# Patient Record
Sex: FEMALE | Race: ASIAN | NOT HISPANIC OR LATINO | Employment: OTHER | URBAN - METROPOLITAN AREA
[De-identification: names, ages, dates, MRNs, and addresses within clinical notes are randomized per-mention and may not be internally consistent; named-entity substitution may affect disease eponyms.]

---

## 2017-02-22 ENCOUNTER — OFFICE VISIT (OUTPATIENT)
Dept: MEDICAL GROUP | Facility: PHYSICIAN GROUP | Age: 57
End: 2017-02-22
Payer: COMMERCIAL

## 2017-02-22 ENCOUNTER — HOSPITAL ENCOUNTER (OUTPATIENT)
Dept: LAB | Facility: MEDICAL CENTER | Age: 57
End: 2017-02-22
Attending: FAMILY MEDICINE
Payer: COMMERCIAL

## 2017-02-22 VITALS
RESPIRATION RATE: 14 BRPM | TEMPERATURE: 98.2 F | HEART RATE: 66 BPM | BODY MASS INDEX: 20.87 KG/M2 | HEIGHT: 62 IN | DIASTOLIC BLOOD PRESSURE: 82 MMHG | WEIGHT: 113.4 LBS | OXYGEN SATURATION: 99 % | SYSTOLIC BLOOD PRESSURE: 126 MMHG

## 2017-02-22 DIAGNOSIS — I10 ESSENTIAL HYPERTENSION, BENIGN: ICD-10-CM

## 2017-02-22 DIAGNOSIS — R73.02 IMPAIRED GLUCOSE TOLERANCE: ICD-10-CM

## 2017-02-22 DIAGNOSIS — E78.2 MIXED HYPERLIPIDEMIA: ICD-10-CM

## 2017-02-22 DIAGNOSIS — Z13.29 SCREENING FOR ENDOCRINE DISORDER: ICD-10-CM

## 2017-02-22 DIAGNOSIS — E87.6 LOW SERUM POTASSIUM LEVEL: ICD-10-CM

## 2017-02-22 PROBLEM — Z90.711 S/P LAPAROSCOPIC SUPRACERVICAL HYSTERECTOMY: Status: ACTIVE | Noted: 2017-02-22

## 2017-02-22 LAB
25(OH)D3 SERPL-MCNC: 66 NG/ML (ref 30–100)
ALBUMIN SERPL BCP-MCNC: 4 G/DL (ref 3.2–4.9)
ALBUMIN/GLOB SERPL: 1.3 G/DL
ALP SERPL-CCNC: 47 U/L (ref 30–99)
ALT SERPL-CCNC: 19 U/L (ref 2–50)
ANION GAP SERPL CALC-SCNC: 6 MMOL/L (ref 0–11.9)
AST SERPL-CCNC: 22 U/L (ref 12–45)
BILIRUB SERPL-MCNC: 0.7 MG/DL (ref 0.1–1.5)
BUN SERPL-MCNC: 17 MG/DL (ref 8–22)
CALCIUM SERPL-MCNC: 9.8 MG/DL (ref 8.5–10.5)
CHLORIDE SERPL-SCNC: 104 MMOL/L (ref 96–112)
CHOLEST SERPL-MCNC: 194 MG/DL (ref 100–199)
CO2 SERPL-SCNC: 30 MMOL/L (ref 20–33)
CREAT SERPL-MCNC: 0.52 MG/DL (ref 0.5–1.4)
EST. AVERAGE GLUCOSE BLD GHB EST-MCNC: 114 MG/DL
GLOBULIN SER CALC-MCNC: 3 G/DL (ref 1.9–3.5)
GLUCOSE SERPL-MCNC: 86 MG/DL (ref 65–99)
HBA1C MFR BLD: 5.6 % (ref 0–5.6)
HDLC SERPL-MCNC: 54 MG/DL
LDLC SERPL CALC-MCNC: 119 MG/DL
POTASSIUM SERPL-SCNC: 3.8 MMOL/L (ref 3.6–5.5)
PROT SERPL-MCNC: 7 G/DL (ref 6–8.2)
SODIUM SERPL-SCNC: 140 MMOL/L (ref 135–145)
TRIGL SERPL-MCNC: 106 MG/DL (ref 0–149)
TSH SERPL DL<=0.005 MIU/L-ACNC: 1.93 UIU/ML (ref 0.3–3.7)

## 2017-02-22 PROCEDURE — 99214 OFFICE O/P EST MOD 30 MIN: CPT | Performed by: FAMILY MEDICINE

## 2017-02-22 PROCEDURE — 84443 ASSAY THYROID STIM HORMONE: CPT

## 2017-02-22 PROCEDURE — 36415 COLL VENOUS BLD VENIPUNCTURE: CPT

## 2017-02-22 PROCEDURE — 80053 COMPREHEN METABOLIC PANEL: CPT

## 2017-02-22 PROCEDURE — 80061 LIPID PANEL: CPT

## 2017-02-22 PROCEDURE — 83036 HEMOGLOBIN GLYCOSYLATED A1C: CPT

## 2017-02-22 PROCEDURE — 82306 VITAMIN D 25 HYDROXY: CPT

## 2017-02-22 RX ORDER — POTASSIUM CHLORIDE 750 MG/1
10 TABLET, FILM COATED, EXTENDED RELEASE ORAL DAILY
Qty: 90 TAB | Refills: 3 | Status: SHIPPED | OUTPATIENT
Start: 2017-02-22 | End: 2018-04-07 | Stop reason: SDUPTHER

## 2017-02-22 RX ORDER — HYDROCHLOROTHIAZIDE 12.5 MG/1
12.5 TABLET ORAL DAILY
Qty: 90 TAB | Refills: 3 | Status: SHIPPED | OUTPATIENT
Start: 2017-02-22 | End: 2018-04-07 | Stop reason: SDUPTHER

## 2017-02-22 NOTE — Clinical Note
[facility]  Maureen Vazquez M.D.  [address]  [place] [address]  Fax: [phone]   Authorization for Release/Disclosure of   Protected Health Information   Name: VALENTINE MORRISAlinaMCBRIDE : 1960 SSN: [id number]   Address: [address] [place] [address]  [place] [address] Phone:    [phone] (home) [phone] (work)   I authorize the entity listed below to release/disclose the PHI below to:   [facility]/Maureen Vazquez M.D. and Maureen Vazquez M.D.   Provider or Entity Name:CLEMENCIA Ruiz/ Dr. Fabian Armstrong   Address   [facility], [facility], [facility]   Phone:    Fax:   Reason for request: continuity of care   Information to be released:    [  ] LAST COLONOSCOPY,  including any PATH REPORT and follow-up  [  ] LAST FIT/COLOGUARD RESULT [  ] LAST DEXA  [  ] LAST MAMMOGRAM  [ X ] LAST PAP  [  ] LAST LABS [  ] RETINA EXAM REPORT  [  ] IMMUNIZATION RECORDS  [  ] Release all info          DATES OF SERVICE OR TIME PERIOD TO BE DISCLOSED: _____________  I understand and acknowledge that:  * This Authorization may be revoked at any time by you in writing, except if your health information has already been used or disclosed.  * Your health information that will be used or disclosed as a result of you signing this authorization could be re-disclosed by the recipient. If this occurs, your re-disclosed health information may no longer be protected by State or Federal laws.  * You may refuse to sign this Authorization. Your refusal will not affect your ability to obtain treatment.  * This Authorization becomes effective upon signing and will  on (date) __________.      If no date is indicated, this Authorization will  one (1) year from the signature date.    Name: Valentine MorrisKevon   Date:     2017       PLEASE FAX REQUESTED RECORDS BACK TO: [phone]

## 2017-02-22 NOTE — PROGRESS NOTES
Chief Complaint   Patient presents with   • Establish Care       HISTORY OF PRESENT ILLNESS: Patient is a 56 y.o. female established patient here today for the following concerns:  This is a very pleasant 56 year old female here today to establish care:    1. Impaired glucose tolerance  Reports that she has been monitored about every 6 months for diabetes.  She has family hx of DM2 and has had pre-diabetes.  She gets tingling in her feet every so often and is concerned this may be diabetic neuropathy starting.  She tries to keep her weight down and eat a very healthy diet.      2. Essential hypertension, benign  On HCTZ only.  Reports that she has tried several other bp meds and this has been the only tolerable and effective medication.  She has hx of mast cell disease with urticaria (therefore ACE/ARB are contraindicated).  Denies any CP, Leg swelling, visual changes, or unusual headaches.     3. Low serum potassium level  2/2 to the HCTZ.  Tolerates 10 meq of potassium daily.  Levels have been stable.  Due for recheck.     4. Mixed hyperlipidemia  Hx of hyperlipidemia.  Previously on Fibrate therapy.  Reports that she stopped it several months back and is hoping she has been able to control cholesterol with diet alone.      5. Screening for endocrine disorder  Due for Vit D and TSH screening.        Past Medical, Social, and Family history reviewed and updated in EPIC    Allergies:Ciprofloxacin    Current Outpatient Prescriptions   Medication Sig Dispense Refill   • hydrochlorothiazide (HYDRODIURIL) 12.5 MG tablet Take 1 Tab by mouth every day. 90 Tab 3   • potassium chloride ER (KLOR-CON) 10 MEQ tablet Take 1 Tab by mouth every day. 90 Tab 3   • RESTASIS 0.05 % ophthalmic emulsion      • azelastine (ASTELIN) 137 MCG/SPRAY nasal spray      • bimatoprost (LUMIGAN) 0.03 % Solution Place 1 Drop in both eyes every evening.     • Azelastine-Fluticasone 137-50 MCG/ACT Suspension Spray  in nose.       No current  "facility-administered medications for this visit.         ROS:  Review of Systems   Constitutional: Negative for fever, chills, weight loss and malaise/fatigue.   HENT: Negative for ear pain, nosebleeds, congestion, sore throat and neck pain.    Eyes: Negative for blurred vision.   Respiratory: Negative for cough, sputum production, shortness of breath and wheezing.    Cardiovascular: Negative for chest pain, palpitations,  and leg swelling.   Gastrointestinal: Negative for heartburn, nausea, vomiting, diarrhea and abdominal pain.   Genitourinary: Negative for dysuria, urgency and frequency.   Musculoskeletal: Negative for myalgias, back pain and joint pain.   Skin: Negative for rash and itching.   Neurological: Negative for dizziness, tingling, tremors, sensory change, focal weakness and headaches.   Endo/Heme/Allergies: Does not bruise/bleed easily.   Psychiatric/Behavioral: Negative for depression, anxiety, suicidal ideas, insomnia and memory loss.      Exam:  Blood pressure 126/82, pulse 66, temperature 36.8 °C (98.2 °F), resp. rate 14, height 1.575 m (5' 2.01\"), weight 51.438 kg (113 lb 6.4 oz), SpO2 99 %.    General:  Well nourished, well developed in NAD  Head is grossly normal.  Neck: Supple without JVD   Pulmonary:  Normal effort.   Cardiovascular: Regular rate  Extremities: no clubbing, cyanosis, or edema.  Psych: affect appropriate      Please note that this dictation was created using voice recognition software. I have made every reasonable attempt to correct obvious errors, but I expect that there are errors of grammar and possibly content that I did not discover before finalizing the note.    Assessment/Plan:  1. Impaired glucose tolerance  Continue with diet and exercise, check status.   - COMP METABOLIC PANEL; Future  - HEMOGLOBIN A1C; Future  - TSH WITH REFLEX TO FT4; Future    2. Essential hypertension, benign  Stable with current regimen.  Appropriate medications and lab monitored " routinely.  Continue HCTZ and potassium  - COMP METABOLIC PANEL; Future  - TSH WITH REFLEX TO FT4; Future    3. Low serum potassium level  Recheck levels.     4. Mixed hyperlipidemia  Recheck  - COMP METABOLIC PANEL; Future  - LIPID PROFILE; Future    5. Screening for endocrine disorder    - TSH WITH REFLEX TO FT4; Future  - VITAMIN D,25 HYDROXY; Future    Follow up in 6 months, sooner prn labs  Request records from previous PCP (Paul) and GI

## 2017-02-22 NOTE — MR AVS SNAPSHOT
"        Asmita Lynnkartik Angel   2017 7:00 AM   Office Visit   MRN: [id number]    Department:  [facility]   Dept Phone:  [phone]    Description:  Female : 1960   Provider:  Maureen Vazquez M.D.           Reason for Visit     Establish Care           Allergies as of 2017     Allergen Noted Reactions    Ciprofloxacin 2015   Hives    itching      You were diagnosed with     Impaired glucose tolerance   [134080]       Essential hypertension, benign   [401.1.ICD-9-CM]       Low serum potassium level   [2334094]       Mixed hyperlipidemia   [272.2.ICD-9-CM]       Screening for endocrine disorder   [1390447]         Vital Signs     Blood Pressure Pulse Temperature Respirations Height Weight    126/82 mmHg 66 36.8 °C (98.2 °F) 14 1.575 m (5' 2.01\") 51.438 kg (113 lb 6.4 oz)    Body Mass Index Oxygen Saturation Smoking Status             20.74 kg/m2 99% Never Smoker          Basic Information     Date Of Birth Sex Race Ethnicity Preferred Language    1960 Female  Non- English      Problem List              ICD-10-CM Priority Class Noted - Resolved    Essential hypertension, benign I10   Unknown - Present    Glaucoma H40.9   Unknown - Present    Mixed hyperlipidemia E78.2   Unknown - Present    Allergic rhinitis due to pollen J30.1   Unknown - Present    Sciatica M54.30   2013 - Present    Dry eye H04.129   1/15/2014 - Present    Headache R51   2014 - Present    RUQ abdominal pain R10.11   2014 - Present    Mast cell disease Q82.2   8/15/2016 - Present    Low serum potassium level E87.6   8/15/2016 - Present    Impaired glucose tolerance R73.02   2017 - Present    S/P laparoscopic supracervical hysterectomy Z90.711   2017 - Present      Health Maintenance        Date Due Completion Dates    IMM DTaP/Tdap/Td Vaccine (1 - Tdap) 1981 ---    MAMMOGRAM 2017, 2015, 2015, 2014, 3/19/2014, 9/3/2013, 3/13/2013, 2012, " 10/30/2012, 9/6/2011, 9/3/2010, 9/2/2009, 9/2/2009, 8/28/2008, 8/28/2008, 8/27/2007, 8/27/2007    PAP SMEAR 6/14/2019 6/14/2016 (Done)    Override on 6/14/2016: Done (Dr. Avendaño)    COLONOSCOPY 8/6/2022 8/6/2012 (Done)    Override on 8/6/2012: Done            Current Immunizations     Influenza Vaccine Quad Inj (Pf) 10/12/2016 10:00 AM      Below and/or attached are the medications your provider expects you to take. Review all of your home medications and newly ordered medications with your provider and/or pharmacist. Follow medication instructions as directed by your provider and/or pharmacist. Please keep your medication list with you and share with your provider. Update the information when medications are discontinued, doses are changed, or new medications (including over-the-counter products) are added; and carry medication information at all times in the event of emergency situations     Allergies:  CIPROFLOXACIN - Hives               Medications  Valid as of: February 22, 2017 -  7:36 AM    Generic Name Brand Name Tablet Size Instructions for use    Azelastine HCl (Solution) ASTELIN 137 MCG/SPRAY         Azelastine-Fluticasone (Suspension) Azelastine-Fluticasone 137-50 MCG/ACT Spray  in nose.        Bimatoprost (Solution) LUMIGAN 0.03 % Place 1 Drop in both eyes every evening.        CycloSPORINE (Emulsion) RESTASIS 0.05 %         HydroCHLOROthiazide (Tab) HYDRODIURIL 12.5 MG Take 1 Tab by mouth every day.        Potassium Chloride (Tab CR) KLOR-CON 10 MEQ Take 1 Tab by mouth every day.        .                 Medicines prescribed today were sent to:     [facility] - [place], [place] - [address]    [address] [place] [address]    Phone: [phone] Fax: [phone]    Open 24 Hours?: No      Medication refill instructions:       If your prescription bottle indicates you have medication refills left, it is not necessary to call your provider’s office. Please contact your pharmacy and they will  refill your medication.    If your prescription bottle indicates you do not have any refills left, you may request refills at any time through one of the following ways: The online [facility] system (except Urgent Care), by calling your provider’s office, or by asking your pharmacy to contact your provider’s office with a refill request. Medication refills are processed only during regular business hours and may not be available until the next business day. Your provider may request additional information or to have a follow-up visit with you prior to refilling your medication.   *Please Note: Medication refills are assigned a new Rx number when refilled electronically. Your pharmacy may indicate that no refills were authorized even though a new prescription for the same medication is available at the pharmacy. Please request the medicine by name with the pharmacy before contacting your provider for a refill.        Your To Do List     Future Labs/Procedures Complete By Expires    COMP METABOLIC PANEL  As directed 2/22/2018    HEMOGLOBIN A1C  As directed 2/22/2018    LIPID PROFILE  As directed 2/22/2018    TSH WITH REFLEX TO FT4  As directed 2/22/2018    VITAMIN D,25 HYDROXY  As directed 2/22/2018         [facility] Access Code: Activation code not generated  Current [facility] Status: Active

## 2017-04-24 ENCOUNTER — OFFICE VISIT (OUTPATIENT)
Dept: OTHER | Facility: IMAGING CENTER | Age: 57
End: 2017-04-24
Payer: COMMERCIAL

## 2017-04-24 DIAGNOSIS — H04.129 DRY EYE: ICD-10-CM

## 2017-04-24 DIAGNOSIS — G44.219 EPISODIC TENSION-TYPE HEADACHE, NOT INTRACTABLE: ICD-10-CM

## 2017-04-24 DIAGNOSIS — J30.1 NON-SEASONAL ALLERGIC RHINITIS DUE TO POLLEN: Primary | ICD-10-CM

## 2017-04-24 DIAGNOSIS — R23.3 EASY BRUISING: ICD-10-CM

## 2017-04-24 PROCEDURE — 99213 OFFICE O/P EST LOW 20 MIN: CPT | Mod: 25 | Performed by: FAMILY MEDICINE

## 2017-04-24 PROCEDURE — 97811 ACUP 1/> W/O ESTIM EA ADD 15: CPT | Performed by: FAMILY MEDICINE

## 2017-04-24 PROCEDURE — 97810 ACUP 1/> WO ESTIM 1ST 15 MIN: CPT | Performed by: FAMILY MEDICINE

## 2017-04-24 NOTE — MR AVS SNAPSHOT
Asmita Allen Jeanne   2017 5:00 PM   Office Visit   MRN: [id number]    Department:  [facility]   Dept Phone:  [phone]    Description:  Female : 1960   Provider:  Martín Vargas M.D.           Allergies as of 2017     Allergen Noted Reactions    Ciprofloxacin 2015   Hives    itching      Vital Signs     Smoking Status                   Never Smoker            Basic Information     Date Of Birth Sex Race Ethnicity Preferred Language    1960 Female  Non- English      Problem List              ICD-10-CM Priority Class Noted - Resolved    Essential hypertension, benign I10   Unknown - Present    Glaucoma H40.9   Unknown - Present    Mixed hyperlipidemia E78.2   Unknown - Present    Allergic rhinitis due to pollen J30.1   Unknown - Present    Sciatica M54.30   2013 - Present    Dry eye H04.129   1/15/2014 - Present    Headache R51   2014 - Present    RUQ abdominal pain R10.11   2014 - Present    Mast cell disease Q82.2   8/15/2016 - Present    Low serum potassium level E87.6   8/15/2016 - Present    Impaired glucose tolerance R73.02   2017 - Present    S/P laparoscopic supracervical hysterectomy Z90.711   2017 - Present      Health Maintenance        Date Due Completion Dates    IMM DTaP/Tdap/Td Vaccine (1 - Tdap) 1981 ---    MAMMOGRAM 2017, 2015, 2015, 2014, 3/19/2014, 9/3/2013, 3/13/2013, 2012, 10/30/2012, 2011, 9/3/2010, 2009, 2009, 2008, 2008, 2007, 2007    PAP SMEAR 2019 (Done)    Override on 2016: Done (Dr. Avendaño)    COLONOSCOPY 2022 (Done)    Override on 2012: Done            Current Immunizations     Influenza Vaccine Quad Inj (Pf) 10/12/2016 10:00 AM      Below and/or attached are the medications your provider expects you to take. Review all of your home medications and newly ordered medications with your provider  and/or pharmacist. Follow medication instructions as directed by your provider and/or pharmacist. Please keep your medication list with you and share with your provider. Update the information when medications are discontinued, doses are changed, or new medications (including over-the-counter products) are added; and carry medication information at all times in the event of emergency situations     Allergies:  CIPROFLOXACIN - Hives               Medications  Valid as of: April 24, 2017 -  6:19 PM    Generic Name Brand Name Tablet Size Instructions for use    Azelastine HCl (Solution) ASTELIN 137 MCG/SPRAY         Azelastine-Fluticasone (Suspension) Azelastine-Fluticasone 137-50 MCG/ACT Spray  in nose.        Bimatoprost (Solution) LUMIGAN 0.03 % Place 1 Drop in both eyes every evening.        CycloSPORINE (Emulsion) RESTASIS 0.05 %         HydroCHLOROthiazide (Tab) HYDRODIURIL 12.5 MG Take 1 Tab by mouth every day.        Potassium Chloride (Tab CR) KLOR-CON 10 MEQ Take 1 Tab by mouth every day.        .                 Medicines prescribed today were sent to:     [facility] [place], [place] - [address] [id number] [facility] [address]    Phone: [phone] Fax: [phone]    Open 24 Hours?: No      Medication refill instructions:       If your prescription bottle indicates you have medication refills left, it is not necessary to call your provider’s office. Please contact your pharmacy and they will refill your medication.    If your prescription bottle indicates you do not have any refills left, you may request refills at any time through one of the following ways: The online [facility] system (except Urgent Care), by calling your provider’s office, or by asking your pharmacy to contact your provider’s office with a refill request. Medication refills are processed only during regular business hours and may not be available until the next business day. Your provider may request additional information or  to have a follow-up visit with you prior to refilling your medication.   *Please Note: Medication refills are assigned a new Rx number when refilled electronically. Your pharmacy may indicate that no refills were authorized even though a new prescription for the same medication is available at the pharmacy. Please request the medicine by name with the pharmacy before contacting your provider for a refill.        Instructions    The side effects of Acupuncture needle insertion include: minor bruising, bleeding, or pain at the site of needle insertion.  If more worrisome symptoms, such as continued bleeding, severe bruising, or continue pain or altered sensation persist, please contact [facility] office @ [phone]            [facility] Access Code: Activation code not generated  Current [facility] Status: Active

## 2017-04-25 NOTE — PATIENT INSTRUCTIONS
The side effects of Acupuncture needle insertion include: minor bruising, bleeding, or pain at the site of needle insertion.  If more worrisome symptoms, such as continued bleeding, severe bruising, or continue pain or altered sensation persist, please contact [facility] office @ [phone]
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
## 2017-06-27 ENCOUNTER — RX ONLY (OUTPATIENT)
Age: 57
Setting detail: RX ONLY
End: 2017-06-27

## 2017-07-13 ENCOUNTER — OFFICE VISIT (OUTPATIENT)
Dept: MEDICAL GROUP | Facility: PHYSICIAN GROUP | Age: 57
End: 2017-07-13
Payer: COMMERCIAL

## 2017-07-13 VITALS
RESPIRATION RATE: 14 BRPM | DIASTOLIC BLOOD PRESSURE: 80 MMHG | TEMPERATURE: 97.5 F | WEIGHT: 116 LBS | OXYGEN SATURATION: 95 % | BODY MASS INDEX: 21.35 KG/M2 | SYSTOLIC BLOOD PRESSURE: 126 MMHG | HEART RATE: 90 BPM | HEIGHT: 62 IN

## 2017-07-13 DIAGNOSIS — R05.9 COUGH: ICD-10-CM

## 2017-07-13 DIAGNOSIS — L65.9 ALOPECIA: ICD-10-CM

## 2017-07-13 DIAGNOSIS — D47.09 MAST CELL DISEASE: ICD-10-CM

## 2017-07-13 PROCEDURE — 99214 OFFICE O/P EST MOD 30 MIN: CPT | Performed by: FAMILY MEDICINE

## 2017-07-13 NOTE — PROGRESS NOTES
Chief Complaint   Patient presents with   • Cough     x 3 days   • Alopecia       HISTORY OF PRESENT ILLNESS: Patient is a 57 y.o. female established patient here today for the following concerns:    1. Cough  Here today for concerns of 3 days of cough, sore throat associated with post nasal drainage, not responding to usual antihistamines, saline rinses and humidifier.  No epistaxis.  No fevers, chills.  Denies feeling ill.    No wheezing.     2. Alopecia  Patient was told by her hairdresser several months ago it looked as though she was getting some thinning of hair, particularly in the front.  She reports that she did bring it up to her dermatologist who prescribed propecia.  She is not sure if she wants to use it.  Most recent labs are reviewed.  She often supplements a healthy diet with vitamins.  No patchy loss.        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 Has been doing ok, but reports that she has been getting some rash around the bra line.  She has been hesitant on using the zantac for concern over worsening dryness (dry mouth, dry eye).        Past Medical, Social, and Family history reviewed and updated in EPIC    Allergies:Ciprofloxacin    Current Outpatient Prescriptions   Medication Sig Dispense Refill   • hydrochlorothiazide (HYDRODIURIL) 12.5 MG tablet Take 1 Tab by mouth every day. 90 Tab 3   • potassium chloride ER (KLOR-CON) 10 MEQ tablet Take 1 Tab by mouth every day. 90 Tab 3   • RESTASIS 0.05 % ophthalmic emulsion      • azelastine (ASTELIN) 137 MCG/SPRAY nasal spray      • bimatoprost (LUMIGAN) 0.03 % Solution Place 1 Drop in both eyes every evening.     • Azelastine-Fluticasone 137-50 MCG/ACT Suspension Spray  in nose.       No current facility-administered medications for this visit.         ROS:  Review of Systems   Constitutional: Negative for fever, chills, weight loss and malaise/fatigue.   HENT: Negative for ear pain, nosebleeds, congestion, sore throat  "and neck pain.    Eyes: Negative for blurred vision.   Respiratory: + cough, no sputum production, shortness of breath and wheezing.    Cardiovascular: Negative for chest pain, palpitations,  and leg swelling.   Gastrointestinal: Negative for heartburn, nausea, vomiting, diarrhea and abdominal pain.   Genitourinary: Negative for dysuria, urgency and frequency.   Musculoskeletal: Negative for myalgias, back pain and joint pain.   Skin: Negative for rash and itching.   Neurological: Negative for dizziness, tingling, tremors, sensory change, focal weakness and headaches.   Endo/Heme/Allergies: Does not bruise/bleed easily.   Psychiatric/Behavioral: Negative for depression, anxiety, suicidal ideas, insomnia and memory loss.      Exam:  Blood pressure 126/80, pulse 90, temperature 36.4 °C (97.5 °F), resp. rate 14, height 1.575 m (5' 2.01\"), weight 52.617 kg (116 lb), SpO2 95 %.    General:  Well nourished, well developed in NAD  Head is grossly normal.  HEENT: Normocephalic and atraumatic. TMs clear bilaterally. Oropharynx is clear without erythema and no tonsillar exudate. Nasal boggy mucosa pink with minimal rhinorrhea.  Neck: Supple without JVD   Pulmonary:  Normal effort. CTAB  Cardiovascular: Regular rate  Extremities: no clubbing, cyanosis, or edema.  Psych: affect appropriate      Please note that this dictation was created using voice recognition software. I have made every reasonable attempt to correct obvious errors, but I expect that there are errors of grammar and possibly content that I did not discover before finalizing the note.    Assessment/Plan:  1. Cough  Likely allergic, continue antihistamines, nasal rinses, increase hydration.      2. Alopecia  Suspect this is likely genetic.  Review of medications and labs show no indication.  She will continue to monitor     3. Mast cell disease  Agree with the addition of zantac.      Follow up prn.          "

## 2017-07-31 ENCOUNTER — OFFICE VISIT (OUTPATIENT)
Dept: OTHER | Facility: IMAGING CENTER | Age: 57
End: 2017-07-31
Payer: COMMERCIAL

## 2017-07-31 ENCOUNTER — OFFICE VISIT (OUTPATIENT)
Dept: MEDICAL GROUP | Facility: PHYSICIAN GROUP | Age: 57
End: 2017-07-31
Payer: COMMERCIAL

## 2017-07-31 ENCOUNTER — HOSPITAL ENCOUNTER (OUTPATIENT)
Dept: RADIOLOGY | Facility: MEDICAL CENTER | Age: 57
End: 2017-07-31
Attending: NURSE PRACTITIONER
Payer: COMMERCIAL

## 2017-07-31 VITALS
RESPIRATION RATE: 16 BRPM | OXYGEN SATURATION: 98 % | SYSTOLIC BLOOD PRESSURE: 124 MMHG | HEIGHT: 62 IN | TEMPERATURE: 98.6 F | DIASTOLIC BLOOD PRESSURE: 80 MMHG | BODY MASS INDEX: 21.35 KG/M2 | WEIGHT: 116 LBS | HEART RATE: 96 BPM

## 2017-07-31 DIAGNOSIS — M25.561 RIGHT ANTERIOR KNEE PAIN: Primary | ICD-10-CM

## 2017-07-31 DIAGNOSIS — R25.3 EYELID TWITCH: ICD-10-CM

## 2017-07-31 DIAGNOSIS — J30.89 NON-SEASONAL ALLERGIC RHINITIS DUE TO OTHER ALLERGIC TRIGGER: ICD-10-CM

## 2017-07-31 DIAGNOSIS — M25.561 CHRONIC PAIN OF RIGHT KNEE: Primary | ICD-10-CM

## 2017-07-31 DIAGNOSIS — M22.41 CHONDROMALACIA PATELLAE OF RIGHT KNEE: ICD-10-CM

## 2017-07-31 DIAGNOSIS — G89.29 CHRONIC PAIN OF RIGHT KNEE: Primary | ICD-10-CM

## 2017-07-31 DIAGNOSIS — M25.561 RIGHT ANTERIOR KNEE PAIN: ICD-10-CM

## 2017-07-31 DIAGNOSIS — H04.129 DRY EYE: ICD-10-CM

## 2017-07-31 PROCEDURE — 99213 OFFICE O/P EST LOW 20 MIN: CPT | Performed by: NURSE PRACTITIONER

## 2017-07-31 PROCEDURE — 73562 X-RAY EXAM OF KNEE 3: CPT | Mod: RT

## 2017-07-31 PROCEDURE — 99213 OFFICE O/P EST LOW 20 MIN: CPT | Mod: 25 | Performed by: FAMILY MEDICINE

## 2017-07-31 PROCEDURE — 97811 ACUP 1/> W/O ESTIM EA ADD 15: CPT | Performed by: FAMILY MEDICINE

## 2017-07-31 PROCEDURE — 97810 ACUP 1/> WO ESTIM 1ST 15 MIN: CPT | Performed by: FAMILY MEDICINE

## 2017-07-31 NOTE — MR AVS SNAPSHOT
Asmita Allen Jeanne   2017 5:00 PM   Office Visit   MRN: [id number]    Department:  Acupuncture [facility]   Dept Phone:  [phone]    Description:  Female : 1960   Provider:  Martín Vargas M.D.           Allergies as of 2017     Allergen Noted Reactions    Ciprofloxacin 2015   Hives    itching      You were diagnosed with     Chronic pain of right knee   [4087710]  -  Primary     Dry eye   [[id number]]       Non-seasonal allergic rhinitis due to other allergic trigger   [7202257]       Eyelid twitch   [207773]         Vital Signs     Smoking Status                   Never Smoker            Basic Information     Date Of Birth Sex Race Ethnicity Preferred Language    1960 Female  Non- English      Problem List              ICD-10-CM Priority Class Noted - Resolved    Essential hypertension, benign I10   Unknown - Present    Glaucoma H40.9   Unknown - Present    Mixed hyperlipidemia E78.2   Unknown - Present    Allergic rhinitis due to pollen J30.1   Unknown - Present    Sciatica M54.30   2013 - Present    Dry eye H04.129   1/15/2014 - Present    Headache R51   2014 - Present    RUQ abdominal pain R10.11   2014 - Present    Mast cell disease Q82.2   8/15/2016 - Present    Low serum potassium level E87.6   8/15/2016 - Present    Impaired glucose tolerance R73.02   2017 - Present    S/P laparoscopic supracervical hysterectomy Z90.711   2017 - Present    Easy bruising R23.8   2017 - Present      Health Maintenance        Date Due Completion Dates    IMM DTaP/Tdap/Td Vaccine (1 - Tdap) 1981 ---    IMM INFLUENZA (1) 2017 10/12/2016    MAMMOGRAM 2017, 2015, 2015, 2014, 3/19/2014, 9/3/2013, 3/13/2013, 2012, 10/30/2012, 2011, 9/3/2010, 2009, 2009, 2008, 2008, 2007, 2007    PAP SMEAR 2019 (Done)    Override on 2016: Done (Dr. Avendaño)    COLONOSCOPY 2022  8/6/2012 (Done)    Override on 8/6/2012: Done            Current Immunizations     Influenza Vaccine Quad Inj (Pf) 10/12/2016 10:00 AM      Below and/or attached are the medications your provider expects you to take. Review all of your home medications and newly ordered medications with your provider and/or pharmacist. Follow medication instructions as directed by your provider and/or pharmacist. Please keep your medication list with you and share with your provider. Update the information when medications are discontinued, doses are changed, or new medications (including over-the-counter products) are added; and carry medication information at all times in the event of emergency situations     Allergies:  CIPROFLOXACIN - Hives               Medications  Valid as of: July 31, 2017 -  6:04 PM    Generic Name Brand Name Tablet Size Instructions for use    Azelastine HCl (Solution) ASTELIN 137 MCG/SPRAY         Azelastine-Fluticasone (Suspension) Azelastine-Fluticasone 137-50 MCG/ACT Spray  in nose.        Bimatoprost (Solution) LUMIGAN 0.03 % Place 1 Drop in both eyes every evening.        CycloSPORINE (Emulsion) RESTASIS 0.05 %         HydroCHLOROthiazide (Tab) HYDRODIURIL 12.5 MG Take 1 Tab by mouth every day.        Potassium Chloride (Tab CR) KLOR-CON 10 MEQ Take 1 Tab by mouth every day.        .                 Medicines prescribed today were sent to:     [facility] # - [place], [place] - [address] [place] [address]    Phone: [phone] Fax: [phone]    Open 24 Hours?: No      Medication refill instructions:       If your prescription bottle indicates you have medication refills left, it is not necessary to call your provider’s office. Please contact your pharmacy and they will refill your medication.    If your prescription bottle indicates you do not have any refills left, you may request refills at any time through one of the following ways: The online [employer] system (except  Urgent Care), by calling your provider’s office, or by asking your pharmacy to contact your provider’s office with a refill request. Medication refills are processed only during regular business hours and may not be available until the next business day. Your provider may request additional information or to have a follow-up visit with you prior to refilling your medication.   *Please Note: Medication refills are assigned a new Rx number when refilled electronically. Your pharmacy may indicate that no refills were authorized even though a new prescription for the same medication is available at the pharmacy. Please request the medicine by name with the pharmacy before contacting your provider for a refill.        Instructions    The side effects of Acupuncture needle insertion include: minor bruising, bleeding, or pain at the site of needle insertion.  If more worrisome symptoms, such as continued bleeding, severe bruising, or continue pain or altered sensation persist, please contact [facility] office @ [phone]            [employer] Access Code: Activation code not generated  Current [employer] Status: Active

## 2017-07-31 NOTE — MR AVS SNAPSHOT
"        Asmita Diaznes   2017 3:00 PM   Office Visit   MRN: [id number]    Department:  [facility]   Dept Phone:  [phone]    Description:  Female : 1960   Provider:  AAKASH Cano           Reason for Visit     Knee Pain           Allergies as of 2017     Allergen Noted Reactions    Ciprofloxacin 2015   Hives    itching      You were diagnosed with     Right anterior knee pain   [[id number]]  -  Primary     Chondromalacia patellae of right knee   [071426]         Vital Signs     Blood Pressure Pulse Temperature Respirations Height Weight    124/80 mmHg 96 37 °C (98.6 °F) 16 1.575 m (5' 2\") 52.617 kg (116 lb)    Body Mass Index Oxygen Saturation Smoking Status             21.21 kg/m2 98% Never Smoker          Basic Information     Date Of Birth Sex Race Ethnicity Preferred Language    1960 Female  Non- English      Your appointments     2017  5:00 PM   ACUPUNCTURE 30 with Martín Vargas M.D.   [facility] (--)    [address] [facility] [address]   [phone]              Problem List              ICD-10-CM Priority Class Noted - Resolved    Essential hypertension, benign I10   Unknown - Present    Glaucoma H40.9   Unknown - Present    Mixed hyperlipidemia E78.2   Unknown - Present    Allergic rhinitis due to pollen J30.1   Unknown - Present    Sciatica M54.30   2013 - Present    Dry eye H04.129   1/15/2014 - Present    Headache R51   2014 - Present    RUQ abdominal pain R10.11   2014 - Present    Mast cell disease Q82.2   8/15/2016 - Present    Low serum potassium level E87.6   8/15/2016 - Present    Impaired glucose tolerance R73.02   2017 - Present    S/P laparoscopic supracervical hysterectomy Z90.711   2017 - Present    Easy bruising R23.8   2017 - Present      Health Maintenance        Date Due Completion Dates    IMM DTaP/Tdap/Td Vaccine (1 - Tdap) " 5/19/1981 ---    IMM INFLUENZA (1) 9/1/2017 10/12/2016    MAMMOGRAM 9/19/2017 9/19/2016, 9/16/2015, 4/7/2015, 9/22/2014, 3/19/2014, 9/3/2013, 3/13/2013, 11/16/2012, 10/30/2012, 9/6/2011, 9/3/2010, 9/2/2009, 9/2/2009, 8/28/2008, 8/28/2008, 8/27/2007, 8/27/2007    PAP SMEAR 6/14/2019 6/14/2016 (Done)    Override on 6/14/2016: Done (Dr. Avendaño)    COLONOSCOPY 8/6/2022 8/6/2012 (Done)    Override on 8/6/2012: Done            Current Immunizations     Influenza Vaccine Quad Inj (Pf) 10/12/2016 10:00 AM      Below and/or attached are the medications your provider expects you to take. Review all of your home medications and newly ordered medications with your provider and/or pharmacist. Follow medication instructions as directed by your provider and/or pharmacist. Please keep your medication list with you and share with your provider. Update the information when medications are discontinued, doses are changed, or new medications (including over-the-counter products) are added; and carry medication information at all times in the event of emergency situations     Allergies:  CIPROFLOXACIN - Hives               Medications  Valid as of: July 31, 2017 -  3:23 PM    Generic Name Brand Name Tablet Size Instructions for use    Azelastine HCl (Solution) ASTELIN 137 MCG/SPRAY         Azelastine-Fluticasone (Suspension) Azelastine-Fluticasone 137-50 MCG/ACT Spray  in nose.        Bimatoprost (Solution) LUMIGAN 0.03 % Place 1 Drop in both eyes every evening.        CycloSPORINE (Emulsion) RESTASIS 0.05 %         HydroCHLOROthiazide (Tab) HYDRODIURIL 12.5 MG Take 1 Tab by mouth every day.        Potassium Chloride (Tab CR) KLOR-CON 10 MEQ Take 1 Tab by mouth every day.        .                 Medicines prescribed today were sent to:     [facility] # - [place], [place] - [address]    [address] [place] [address]    Phone: [phone] Fax: [phone]    Open 24 Hours?: No      Medication refill instructions:       If your  prescription bottle indicates you have medication refills left, it is not necessary to call your provider’s office. Please contact your pharmacy and they will refill your medication.    If your prescription bottle indicates you do not have any refills left, you may request refills at any time through one of the following ways: The online [employer] system (except Urgent Care), by calling your provider’s office, or by asking your pharmacy to contact your provider’s office with a refill request. Medication refills are processed only during regular business hours and may not be available until the next business day. Your provider may request additional information or to have a follow-up visit with you prior to refilling your medication.   *Please Note: Medication refills are assigned a new Rx number when refilled electronically. Your pharmacy may indicate that no refills were authorized even though a new prescription for the same medication is available at the pharmacy. Please request the medicine by name with the pharmacy before contacting your provider for a refill.        Your To Do List     Future Labs/Procedures Complete By Expires    DX-KNEE 3 VIEWS RIGHT  As directed 7/31/2018      Referral     A referral request has been sent to our patient care coordination department. Please allow 3-5 business days for us to process this request and contact you either by phone or mail. If you do not hear from us by the 5th business day, please call us at [phone].           [employer] Access Code: Activation code not generated  Current [employer] Status: Active

## 2017-07-31 NOTE — PROGRESS NOTES
"Subjective:      Asmita Horowitz-Lau is a 57 y.o. female who presents with Knee Pain            Knee Pain    Alejandro is here today to discuss the following problem.  She is a patient of Dr. Vazquez, this is her first visit with myself.    1. Right anterior knee pain  2. Chondromalacia patellae of right knee  Patient reports \"episodes\" of right knee pain intermittently.  She states over the past couple of years she has been experiencing anterior and medial right knee pain off and on.  She states that the episodes are becoming more frequent and more intense.  She denies any specific accident or injury.  She walks for exercise.  She has been seeing her chiropractor who has tried help adjusting the area with improvements, short-term.  She takes over-the-counter anti-inflammatories with minimal relief.  She is planning on seeing the chiropractor and acupuncturist within the next couple of days in hopes of reducing the pain.  She denies any catching, locking or giving way.  She mentions that her chiropractor was concerned about possible dislocating or malaligned patella.  Patient reports increased pain with climbing and descending stairs also with squatting.    Active Ambulatory Problems     Diagnosis Date Noted   • Essential hypertension, benign    • Glaucoma    • Mixed hyperlipidemia    • Allergic rhinitis due to pollen    • Sciatica 12/19/2013   • Dry eye 01/15/2014   • Headache 02/20/2014   • RUQ abdominal pain 02/20/2014   • Mast cell disease 08/15/2016   • Low serum potassium level 08/15/2016   • Impaired glucose tolerance 02/22/2017   • S/P laparoscopic supracervical hysterectomy 02/22/2017   • Easy bruising 04/24/2017     Resolved Ambulatory Problems     Diagnosis Date Noted   • No Resolved Ambulatory Problems     Past Medical History   Diagnosis Date   • Cervicalgia    • Allergy      Review of Systems   Musculoskeletal:        See HPI          Objective:     /80 mmHg  Pulse 96  Temp(Src) 37 °C (98.6 °F)  " "Resp 16  Ht 1.575 m (5' 2\")  Wt 52.617 kg (116 lb)  BMI 21.21 kg/m2  SpO2 98%     Physical Exam   Constitutional: She is oriented to person, place, and time. She appears well-developed and well-nourished.   Cardiovascular: Normal rate.    Pulmonary/Chest: Effort normal.   Musculoskeletal:        Right knee: She exhibits normal range of motion, no swelling, no deformity, normal alignment, normal patellar mobility and normal meniscus. Tenderness found. Medial joint line and patellar tendon tenderness noted.   Neurological: She is alert and oriented to person, place, and time.   Skin: Skin is warm and dry.   Nursing note and vitals reviewed.              Assessment/Plan:     1. Right anterior knee pain  DX-KNEE 3 VIEWS RIGHT    REFERRAL TO PHYSICAL THERAPY Reason for Therapy: Eval/Treat/Report   2. Chondromalacia patellae of right knee  DX-KNEE 3 VIEWS RIGHT    REFERRAL TO PHYSICAL THERAPY Reason for Therapy: Eval/Treat/Report       1.  Xray today  2.  Referral to PT  3.  Proceed with acupuncture  4.  Will contact patient with xray results.        "

## 2017-08-01 NOTE — PATIENT INSTRUCTIONS
The side effects of Acupuncture needle insertion include: minor bruising, bleeding, or pain at the site of needle insertion.  If more worrisome symptoms, such as continued bleeding, severe bruising, or continue pain or altered sensation persist, please contact [facility] office @ [phone]

## 2017-09-21 ENCOUNTER — HOSPITAL ENCOUNTER (OUTPATIENT)
Dept: RADIOLOGY | Facility: MEDICAL CENTER | Age: 57
End: 2017-09-21
Attending: OBSTETRICS & GYNECOLOGY
Payer: COMMERCIAL

## 2017-09-21 DIAGNOSIS — Z12.31 VISIT FOR SCREENING MAMMOGRAM: ICD-10-CM

## 2017-09-21 PROCEDURE — G0202 SCR MAMMO BI INCL CAD: HCPCS

## 2017-10-19 ENCOUNTER — TELEPHONE (OUTPATIENT)
Dept: RADIOLOGY | Facility: MEDICAL CENTER | Age: 57
End: 2017-10-19

## 2017-10-19 NOTE — TELEPHONE ENCOUNTER
Spoke w/ pt to jes[facility]s apt @ [facility] on 10/20 @ 7:45 check in @ 7:35, reviewed prep and location

## 2017-10-20 ENCOUNTER — HOSPITAL ENCOUNTER (OUTPATIENT)
Dept: RADIOLOGY | Facility: MEDICAL CENTER | Age: 57
End: 2017-10-20
Attending: OBSTETRICS & GYNECOLOGY
Payer: COMMERCIAL

## 2017-10-20 DIAGNOSIS — R92.8 ABNORMAL MAMMOGRAM OF LEFT BREAST: ICD-10-CM

## 2017-10-20 PROCEDURE — 76642 ULTRASOUND BREAST LIMITED: CPT | Mod: LT

## 2017-10-20 PROCEDURE — G0206 DX MAMMO INCL CAD UNI: HCPCS | Mod: LT

## 2017-10-23 ENCOUNTER — PATIENT MESSAGE (OUTPATIENT)
Dept: MEDICAL GROUP | Facility: PHYSICIAN GROUP | Age: 57
End: 2017-10-23

## 2017-10-24 ENCOUNTER — HOSPITAL ENCOUNTER (OUTPATIENT)
Facility: MEDICAL CENTER | Age: 57
End: 2017-10-24
Payer: COMMERCIAL

## 2017-10-24 LAB
25(OH)D3 SERPL-MCNC: 45 NG/ML (ref 30–100)
ERYTHROCYTE [DISTWIDTH] IN BLOOD BY AUTOMATED COUNT: 41 FL (ref 35.9–50)
EST. AVERAGE GLUCOSE BLD GHB EST-MCNC: 123 MG/DL
HBA1C MFR BLD: 5.9 % (ref 0–5.6)
HCT VFR BLD AUTO: 49.1 % (ref 37–47)
HGB BLD-MCNC: 16.7 G/DL (ref 12–16)
MCH RBC QN AUTO: 31.2 PG (ref 27–33)
MCHC RBC AUTO-ENTMCNC: 34 G/DL (ref 33.6–35)
MCV RBC AUTO: 91.8 FL (ref 81.4–97.8)
PLATELET # BLD AUTO: 275 K/UL (ref 164–446)
PMV BLD AUTO: 9.7 FL (ref 9–12.9)
RBC # BLD AUTO: 5.35 M/UL (ref 4.2–5.4)
T4 FREE SERPL-MCNC: 1.04 NG/DL (ref 0.53–1.43)
WBC # BLD AUTO: 4.6 K/UL (ref 4.8–10.8)

## 2017-10-25 ENCOUNTER — TELEPHONE (OUTPATIENT)
Dept: MEDICAL GROUP | Facility: PHYSICIAN GROUP | Age: 57
End: 2017-10-25

## 2017-10-25 LAB
ALBUMIN SERPL BCP-MCNC: 4.6 G/DL (ref 3.2–4.9)
ALBUMIN/GLOB SERPL: 1.4 G/DL
ALP SERPL-CCNC: 65 U/L (ref 30–99)
ALT SERPL-CCNC: 18 U/L (ref 2–50)
ANION GAP SERPL CALC-SCNC: 8 MMOL/L (ref 0–11.9)
AST SERPL-CCNC: 24 U/L (ref 12–45)
BILIRUB SERPL-MCNC: 0.8 MG/DL (ref 0.1–1.5)
BUN SERPL-MCNC: 16 MG/DL (ref 8–22)
CALCIUM SERPL-MCNC: 10.2 MG/DL (ref 8.5–10.5)
CHLORIDE SERPL-SCNC: 102 MMOL/L (ref 96–112)
CHOLEST SERPL-MCNC: 291 MG/DL (ref 100–199)
CO2 SERPL-SCNC: 30 MMOL/L (ref 20–33)
CREAT SERPL-MCNC: 0.63 MG/DL (ref 0.5–1.4)
GFR SERPL CREATININE-BSD FRML MDRD: >60 ML/MIN/1.73 M 2
GLOBULIN SER CALC-MCNC: 3.2 G/DL (ref 1.9–3.5)
GLUCOSE SERPL-MCNC: 100 MG/DL (ref 65–99)
HDLC SERPL-MCNC: 51 MG/DL
LDLC SERPL CALC-MCNC: 188 MG/DL
POTASSIUM SERPL-SCNC: 4.1 MMOL/L (ref 3.6–5.5)
PROT SERPL-MCNC: 7.8 G/DL (ref 6–8.2)
SODIUM SERPL-SCNC: 140 MMOL/L (ref 135–145)
TRIGL SERPL-MCNC: 260 MG/DL (ref 0–149)

## 2017-10-25 NOTE — TELEPHONE ENCOUNTER
----- Message from Maureen Vazquez M.D. sent at 10/25/2017  7:17 AM PDT -----  Please call patient to ensure they received their results through My Chart.  The patient requires follow up office visit.

## 2017-11-17 ENCOUNTER — OFFICE VISIT (OUTPATIENT)
Dept: MEDICAL GROUP | Facility: PHYSICIAN GROUP | Age: 57
End: 2017-11-17
Payer: COMMERCIAL

## 2017-11-17 VITALS
RESPIRATION RATE: 14 BRPM | WEIGHT: 119 LBS | HEART RATE: 82 BPM | BODY MASS INDEX: 21.9 KG/M2 | TEMPERATURE: 97.2 F | SYSTOLIC BLOOD PRESSURE: 128 MMHG | DIASTOLIC BLOOD PRESSURE: 82 MMHG | HEIGHT: 62 IN | OXYGEN SATURATION: 95 %

## 2017-11-17 DIAGNOSIS — R73.01 IMPAIRED FASTING GLUCOSE: ICD-10-CM

## 2017-11-17 DIAGNOSIS — E78.2 MIXED HYPERLIPIDEMIA: ICD-10-CM

## 2017-11-17 DIAGNOSIS — I10 ESSENTIAL HYPERTENSION, BENIGN: ICD-10-CM

## 2017-11-17 PROCEDURE — 99214 OFFICE O/P EST MOD 30 MIN: CPT | Performed by: FAMILY MEDICINE

## 2017-11-17 RX ORDER — FENOFIBRATE 145 MG/1
145 TABLET, COATED ORAL DAILY
Qty: 90 TAB | Refills: 3 | Status: SHIPPED | OUTPATIENT
Start: 2017-11-17 | End: 2018-12-06 | Stop reason: SDUPTHER

## 2017-11-17 ASSESSMENT — PATIENT HEALTH QUESTIONNAIRE - PHQ9: CLINICAL INTERPRETATION OF PHQ2 SCORE: 0

## 2017-11-17 NOTE — PROGRESS NOTES
Chief Complaint   Patient presents with   • Follow-Up     labs       HISTORY OF PRESENT ILLNESS: Patient is a 57 y.o. female established patient here today for the following concerns:    1. Mixed hyperlipidemia  2. Impaired fasting glucose  3. Essential hypertension, benign  Here for follow up on labs.  Has hx of dyslipidemia, mixed and was previously well controlled on tricor.  Had made significant lifestyle  Modifications previously and stopped.  Now has been off for nearly 1 year.  Lipids have increased as has her A1c.  She is motivated now to work on exercise and reducing sugar in the diet again.  BP remains well controlled.       Past Medical, Social, and Family history reviewed and updated in EPIC    Allergies:Ciprofloxacin    Current Outpatient Prescriptions   Medication Sig Dispense Refill   • fenofibrate (TRICOR) 145 MG Tab Take 1 Tab by mouth every day. 90 Tab 3   • hydrochlorothiazide (HYDRODIURIL) 12.5 MG tablet Take 1 Tab by mouth every day. 90 Tab 3   • potassium chloride ER (KLOR-CON) 10 MEQ tablet Take 1 Tab by mouth every day. 90 Tab 3   • RESTASIS 0.05 % ophthalmic emulsion      • azelastine (ASTELIN) 137 MCG/SPRAY nasal spray      • bimatoprost (LUMIGAN) 0.03 % Solution Place 1 Drop in both eyes every evening.     • Azelastine-Fluticasone 137-50 MCG/ACT Suspension Spray  in nose.       No current facility-administered medications for this visit.          ROS:  Review of Systems   Constitutional: Negative for fever, chills, weight loss and malaise/fatigue.   HENT: Negative for ear pain, nosebleeds, congestion, sore throat and neck pain.    Eyes: Negative for blurred vision.   Respiratory: Negative for cough, sputum production, shortness of breath and wheezing.    Cardiovascular: Negative for chest pain, palpitations,  and leg swelling.   Gastrointestinal: Negative for heartburn, nausea, vomiting, diarrhea and abdominal pain.   Genitourinary: Negative for dysuria, urgency and frequency.  "  Musculoskeletal: Negative for myalgias, back pain and joint pain.   Skin: Negative for rash and itching.   Neurological: Negative for dizziness, tingling, tremors, sensory change, focal weakness and headaches.   Endo/Heme/Allergies: Does not bruise/bleed easily.   Psychiatric/Behavioral: Negative for depression, anxiety, suicidal ideas, insomnia and memory loss.      Exam:  Blood pressure 128/82, pulse 82, temperature 36.2 °C (97.2 °F), resp. rate 14, height 1.575 m (5' 2\"), weight 54 kg (119 lb), SpO2 95 %.    General:  Well nourished, well developed in NAD  Head is grossly normal.  Neck: Supple without JVD   Pulmonary:  Normal effort.   Cardiovascular: Regular rate  Extremities: no clubbing, cyanosis, or edema.  Psych: affect appropriate      Please note that this dictation was created using voice recognition software. I have made every reasonable attempt to correct obvious errors, but I expect that there are errors of grammar and possibly content that I did not discover before finalizing the note.    Assessment/Plan:  1. Mixed hyperlipidemia  - fenofibrate (TRICOR) 145 MG Tab; Take 1 Tab by mouth every day.  Dispense: 90 Tab; Refill: 3  - LIPID PROFILE; Future  - COMP METABOLIC PANEL; Future    2. Impaired fasting glucose  - COMP METABOLIC PANEL; Future  - HEMOGLOBIN A1C; Future    3. Essential hypertension, benign  Continue HCTZ, continue to monitor, may consider ACE instead of HCTZ at next visit.     3 month follow up        "

## 2018-01-03 ENCOUNTER — OFFICE VISIT (OUTPATIENT)
Dept: MEDICAL GROUP | Facility: PHYSICIAN GROUP | Age: 58
End: 2018-01-03
Payer: COMMERCIAL

## 2018-01-03 VITALS
RESPIRATION RATE: 14 BRPM | TEMPERATURE: 98.6 F | HEIGHT: 62 IN | DIASTOLIC BLOOD PRESSURE: 70 MMHG | HEART RATE: 83 BPM | WEIGHT: 113 LBS | OXYGEN SATURATION: 97 % | SYSTOLIC BLOOD PRESSURE: 106 MMHG | BODY MASS INDEX: 20.8 KG/M2

## 2018-01-03 DIAGNOSIS — J06.9 UPPER RESPIRATORY TRACT INFECTION, UNSPECIFIED TYPE: ICD-10-CM

## 2018-01-03 DIAGNOSIS — R52 BODY ACHES: ICD-10-CM

## 2018-01-03 LAB
FLUAV+FLUBV AG SPEC QL IA: NORMAL
INT CON NEG: NEGATIVE
INT CON POS: POSITIVE

## 2018-01-03 PROCEDURE — 99214 OFFICE O/P EST MOD 30 MIN: CPT | Performed by: NURSE PRACTITIONER

## 2018-01-03 PROCEDURE — 87804 INFLUENZA ASSAY W/OPTIC: CPT | Performed by: NURSE PRACTITIONER

## 2018-01-03 RX ORDER — AZITHROMYCIN 250 MG/1
TABLET, FILM COATED ORAL
Qty: 6 TAB | Refills: 0 | Status: SHIPPED | OUTPATIENT
Start: 2018-01-03 | End: 2018-06-05

## 2018-01-03 RX ORDER — CODEINE PHOSPHATE/GUAIFENESIN 10-100MG/5
5 LIQUID (ML) ORAL 3 TIMES DAILY PRN
Qty: 120 ML | Refills: 0 | Status: SHIPPED | OUTPATIENT
Start: 2018-01-03 | End: 2018-01-13

## 2018-01-03 NOTE — ASSESSMENT & PLAN NOTE
Patient reports cough,sore throat, headache and body aches and chills for 4 days following her  having upper respiratory symptoms. Unsure of fever. She received an influenza vaccine this year.

## 2018-01-03 NOTE — PROGRESS NOTES
Subjective:     Chief Complaint   Patient presents with   • Chills     x 4 days    • Sweats     x 4 days        HPI:  Asmita Angel is a 57 y.o. female here today to discuss the following:    URI (upper respiratory infection)  Patient reports cough,sore throat, headache and body aches and chills for 4 days following her  having upper respiratory symptoms. Unsure of fever. She received an influenza vaccine this year.     Current medicines (including changes today)  Current Outpatient Prescriptions   Medication Sig Dispense Refill   • azithromycin (ZITHROMAX) 250 MG Tab Take 2 tablets by mouth today, then one tablet daily for 4 days 6 Tab 0   • guaifenesin-codeine (TUSSI-ORGANIDIN NR) 100-10 MG/5ML syrup Take 5 mL by mouth 3 times a day as needed for Cough for up to 10 days. 120 mL 0   • fenofibrate (TRICOR) 145 MG Tab Take 1 Tab by mouth every day. 90 Tab 3   • hydrochlorothiazide (HYDRODIURIL) 12.5 MG tablet Take 1 Tab by mouth every day. 90 Tab 3   • potassium chloride ER (KLOR-CON) 10 MEQ tablet Take 1 Tab by mouth every day. 90 Tab 3   • RESTASIS 0.05 % ophthalmic emulsion      • azelastine (ASTELIN) 137 MCG/SPRAY nasal spray      • bimatoprost (LUMIGAN) 0.03 % Solution Place 1 Drop in both eyes every evening.     • Azelastine-Fluticasone 137-50 MCG/ACT Suspension Spray  in nose.       No current facility-administered medications for this visit.        She  has a past medical history of Allergic rhinitis due to pollen; Allergy; Cervicalgia; Essential hypertension, benign; Glaucoma; and Mixed hyperlipidemia.    ROS   Review of Systems   Constitutional: Negative for fever, weight loss and malaise/fatigue. Positive for chills  HENT: Negative for ear pain, nosebleeds, congestion, sore throat and neck pain.    Respiratory: Positive for cough, sputum production, Negative for shortness of breath and wheezing.    Cardiovascular: Negative for chest pain, palpitations,  and leg swelling.   Neurological: All  "other systems reviewed and are negative except as in HPI.   Objective:   Physical Exam:  Blood pressure 106/70, pulse 83, temperature 37 °C (98.6 °F), resp. rate 14, height 1.575 m (5' 2\"), weight 51.3 kg (113 lb), SpO2 97 %. Body mass index is 20.67 kg/m².   Physical Exam:  Alert, oriented in no acute distress.  Eye contact is good, speech goal directed, affect calm  HEENT: conjunctiva non-injected, sclera non-icteric.  Pinna normal. Ear canals are clear and TMs within normal limits. Oropharynx is erythematous, edematous without exudate. Nasal passages are clear bilaterally.   Neck No adenopathy or masses in the neck or supraclavicular regions.  Lungs: clear to auscultation bilaterally with good excursion.  CV: regular rate and rhythm.   AMS: Normal gait and station    There are no preventive care reminders to display for this patient.  Assessment and Plan:   The following treatment plan was discussed   1. Upper respiratory tract infection, unspecified type  azithromycin (ZITHROMAX) 250 MG Tab    guaifenesin-codeine (TUSSI-ORGANIDIN NR) 100-10 MG/5ML syrup   2. Body aches  POCT Influenza A/B   Pharyngitis- azithromycin as directed  Positive for influenza A  Followup: Return if symptoms worsen or fail to improve.   Please note that this dictation was created using voice recognition software. I have made every reasonable attempt to correct obvious errors, but I expect that there are errors of grammar and possibly content that I did not discover before finalizing the note.           "

## 2018-01-03 NOTE — LETTER
January 3, 2018       Patient: Asmita Angel   YOB: 1960   Date of Visit: 1/3/2018         To Whom It May Concern:    It is my medical opinion that Asmita Angel  return to full duty, no restrictions January 8, 2017.    If you have any questions or concerns, please don't hesitate to call [phone]          Sincerely,          DIVINA Arguelles.  Electronically Signed

## 2018-01-11 ENCOUNTER — OFFICE VISIT (OUTPATIENT)
Dept: MEDICAL GROUP | Facility: PHYSICIAN GROUP | Age: 58
End: 2018-01-11
Payer: COMMERCIAL

## 2018-01-11 VITALS
DIASTOLIC BLOOD PRESSURE: 72 MMHG | WEIGHT: 112 LBS | RESPIRATION RATE: 12 BRPM | BODY MASS INDEX: 20.61 KG/M2 | OXYGEN SATURATION: 97 % | SYSTOLIC BLOOD PRESSURE: 110 MMHG | HEIGHT: 62 IN | HEART RATE: 88 BPM | TEMPERATURE: 97.7 F

## 2018-01-11 DIAGNOSIS — J10.1 INFLUENZA A: ICD-10-CM

## 2018-01-11 DIAGNOSIS — R05.9 COUGH: ICD-10-CM

## 2018-01-11 PROCEDURE — 99214 OFFICE O/P EST MOD 30 MIN: CPT | Performed by: FAMILY MEDICINE

## 2018-01-11 RX ORDER — BENZONATATE 100 MG/1
100 CAPSULE ORAL 3 TIMES DAILY PRN
Qty: 60 CAP | Refills: 0 | Status: SHIPPED | OUTPATIENT
Start: 2018-01-11 | End: 2018-06-05

## 2018-01-11 NOTE — PROGRESS NOTES
Chief Complaint   Patient presents with   • Cough     x 11 days       HISTORY OF PRESENT ILLNESS: Patient is a 57 y.o. female established patient here today for the following concerns:    1. Influenza A  2. Cough  Here today for follow up.  Was diagnosed with influenza A last week and was given cough syrup to help with the symptoms.  Reports overall improving.  Rattling in the chest has improved.  No further fevers.  Cough is still bothersome.   Did not tolerate the cough syrup well and is looking for an alternative that will not sedate her.        Past Medical, Social, and Family history reviewed and updated in EPIC    Allergies:Ciprofloxacin    Current Outpatient Prescriptions   Medication Sig Dispense Refill   • benzonatate (TESSALON) 100 MG Cap Take 1 Cap by mouth 3 times a day as needed for Cough. 60 Cap 0   • guaifenesin-codeine (TUSSI-ORGANIDIN NR) 100-10 MG/5ML syrup Take 5 mL by mouth 3 times a day as needed for Cough for up to 10 days. 120 mL 0   • fenofibrate (TRICOR) 145 MG Tab Take 1 Tab by mouth every day. 90 Tab 3   • hydrochlorothiazide (HYDRODIURIL) 12.5 MG tablet Take 1 Tab by mouth every day. 90 Tab 3   • potassium chloride ER (KLOR-CON) 10 MEQ tablet Take 1 Tab by mouth every day. 90 Tab 3   • RESTASIS 0.05 % ophthalmic emulsion      • azelastine (ASTELIN) 137 MCG/SPRAY nasal spray      • bimatoprost (LUMIGAN) 0.03 % Solution Place 1 Drop in both eyes every evening.     • Azelastine-Fluticasone 137-50 MCG/ACT Suspension Spray  in nose.     • azithromycin (ZITHROMAX) 250 MG Tab Take 2 tablets by mouth today, then one tablet daily for 4 days 6 Tab 0     No current facility-administered medications for this visit.          ROS:  Review of Systems   Constitutional: Negative for fever, chills, weight loss and malaise/fatigue.   HENT: Negative for ear pain, nosebleeds, congestion, sore throat and neck pain.    Eyes: Negative for blurred vision.   Respiratory: Negative for cough, sputum production,  "shortness of breath and wheezing.    Cardiovascular: Negative for chest pain, palpitations,  and leg swelling.   Gastrointestinal: Negative for heartburn, nausea, vomiting, diarrhea and abdominal pain.   Genitourinary: Negative for dysuria, urgency and frequency.   Musculoskeletal: Negative for myalgias, back pain and joint pain.   Skin: Negative for rash and itching.   Neurological: Negative for dizziness, tingling, tremors, sensory change, focal weakness and headaches.   Endo/Heme/Allergies: Does not bruise/bleed easily.   Psychiatric/Behavioral: Negative for depression, anxiety, suicidal ideas, insomnia and memory loss.      Exam:  Blood pressure 110/72, pulse 88, temperature 36.5 °C (97.7 °F), resp. rate 12, height 1.575 m (5' 2\"), weight 50.8 kg (112 lb), SpO2 97 %.    General:  Well nourished, well developed in NAD  Head is grossly normal.  Neck: Supple without JVD   Pulmonary:  Normal effort. CTAB no w/r/c  Cardiovascular: Regular rate and rhythm  Extremities: no clubbing, cyanosis, or edema.  Psych: affect appropriate      Please note that this dictation was created using voice recognition software. I have made every reasonable attempt to correct obvious errors, but I expect that there are errors of grammar and possibly content that I did not discover before finalizing the note.    Assessment/Plan:  1. Influenza A  Resolved.  - benzonatate (TESSALON) 100 MG Cap; Take 1 Cap by mouth 3 times a day as needed for Cough.  Dispense: 60 Cap; Refill: 0    2. Cough  No evidence of secondary bacterial infection at this time.  Trial  of  - benzonatate (TESSALON) 100 MG Cap; Take 1 Cap by mouth 3 times a day as needed for Cough.  Dispense: 60 Cap; Refill: 0  If she develops recurrent fever with more productive cough we will empirically treat for secondary bacterial pneumonia      Follow up prn        "

## 2018-01-24 ENCOUNTER — PATIENT MESSAGE (OUTPATIENT)
Dept: MEDICAL GROUP | Facility: PHYSICIAN GROUP | Age: 58
End: 2018-01-24

## 2018-02-10 ENCOUNTER — HOSPITAL ENCOUNTER (OUTPATIENT)
Dept: LAB | Facility: MEDICAL CENTER | Age: 58
End: 2018-02-10
Attending: FAMILY MEDICINE
Payer: COMMERCIAL

## 2018-02-16 ENCOUNTER — HOSPITAL ENCOUNTER (OUTPATIENT)
Dept: LAB | Facility: MEDICAL CENTER | Age: 58
End: 2018-02-16
Attending: FAMILY MEDICINE
Payer: COMMERCIAL

## 2018-02-16 DIAGNOSIS — R73.01 IMPAIRED FASTING GLUCOSE: ICD-10-CM

## 2018-02-16 DIAGNOSIS — E78.2 MIXED HYPERLIPIDEMIA: ICD-10-CM

## 2018-02-16 LAB
ALBUMIN SERPL BCP-MCNC: 4.6 G/DL (ref 3.2–4.9)
ALBUMIN/GLOB SERPL: 1.8 G/DL
ALP SERPL-CCNC: 44 U/L (ref 30–99)
ALT SERPL-CCNC: 23 U/L (ref 2–50)
ANION GAP SERPL CALC-SCNC: 2 MMOL/L (ref 0–11.9)
AST SERPL-CCNC: 27 U/L (ref 12–45)
BILIRUB SERPL-MCNC: 0.4 MG/DL (ref 0.1–1.5)
BUN SERPL-MCNC: 23 MG/DL (ref 8–22)
CALCIUM SERPL-MCNC: 9.9 MG/DL (ref 8.5–10.5)
CHLORIDE SERPL-SCNC: 103 MMOL/L (ref 96–112)
CHOLEST SERPL-MCNC: 155 MG/DL (ref 100–199)
CO2 SERPL-SCNC: 35 MMOL/L (ref 20–33)
CREAT SERPL-MCNC: 0.78 MG/DL (ref 0.5–1.4)
EST. AVERAGE GLUCOSE BLD GHB EST-MCNC: 114 MG/DL
GLOBULIN SER CALC-MCNC: 2.5 G/DL (ref 1.9–3.5)
GLUCOSE SERPL-MCNC: 96 MG/DL (ref 65–99)
HBA1C MFR BLD: 5.6 % (ref 0–5.6)
HDLC SERPL-MCNC: 53 MG/DL
LDLC SERPL CALC-MCNC: 89 MG/DL
POTASSIUM SERPL-SCNC: 3.5 MMOL/L (ref 3.6–5.5)
PROT SERPL-MCNC: 7.1 G/DL (ref 6–8.2)
SODIUM SERPL-SCNC: 140 MMOL/L (ref 135–145)
TRIGL SERPL-MCNC: 64 MG/DL (ref 0–149)

## 2018-02-16 PROCEDURE — 36415 COLL VENOUS BLD VENIPUNCTURE: CPT

## 2018-02-16 PROCEDURE — 80053 COMPREHEN METABOLIC PANEL: CPT

## 2018-02-16 PROCEDURE — 80061 LIPID PANEL: CPT

## 2018-02-16 PROCEDURE — 83036 HEMOGLOBIN GLYCOSYLATED A1C: CPT

## 2018-04-09 RX ORDER — HYDROCHLOROTHIAZIDE 12.5 MG/1
TABLET ORAL
Qty: 90 TAB | Refills: 1 | Status: SHIPPED | OUTPATIENT
Start: 2018-04-09 | End: 2018-10-01 | Stop reason: SDUPTHER

## 2018-04-09 RX ORDER — POTASSIUM CHLORIDE 750 MG/1
TABLET, FILM COATED, EXTENDED RELEASE ORAL
Qty: 90 TAB | Refills: 1 | Status: SHIPPED | OUTPATIENT
Start: 2018-04-09 | End: 2018-06-05 | Stop reason: SDUPTHER

## 2018-04-09 NOTE — TELEPHONE ENCOUNTER
Was the patient seen in the last year in this department? Yes     Does patient have an active prescription for medications requested? No     Received Request Via: Pharmacy      Pt met protocol?: Yes    LAST OV 01/11/2018    Lab Results   Component Value Date/Time    CHOLSTRLTOT 155 02/16/2018 10:14 AM    LDL 89 02/16/2018 10:14 AM    HDL 53 02/16/2018 10:14 AM    TRIGLYCERIDE 64 02/16/2018 10:14 AM       Lab Results   Component Value Date/Time    SODIUM 140 02/16/2018 10:14 AM    POTASSIUM 3.5 (L) 02/16/2018 10:14 AM    CHLORIDE 103 02/16/2018 10:14 AM    CO2 35 (H) 02/16/2018 10:14 AM    GLUCOSE 96 02/16/2018 10:14 AM    BUN 23 (H) 02/16/2018 10:14 AM    CREATININE 0.78 02/16/2018 10:14 AM    CREATININE 0.7 03/05/2008 05:17 PM     Lab Results   Component Value Date/Time    ALKPHOSPHAT 44 02/16/2018 10:14 AM    ASTSGOT 27 02/16/2018 10:14 AM    ALTSGPT 23 02/16/2018 10:14 AM    TBILIRUBIN 0.4 02/16/2018 10:14 AM

## 2018-04-09 NOTE — TELEPHONE ENCOUNTER
Refill X 6 months, sent to pharmacy.Pt. Seen in the last 6 months per protocol.   Lab Results   Component Value Date/Time    SODIUM 140 02/16/2018 10:14 AM    POTASSIUM 3.5 (L) 02/16/2018 10:14 AM    CHLORIDE 103 02/16/2018 10:14 AM    CO2 35 (H) 02/16/2018 10:14 AM    GLUCOSE 96 02/16/2018 10:14 AM    BUN 23 (H) 02/16/2018 10:14 AM    CREATININE 0.78 02/16/2018 10:14 AM    CREATININE 0.7 03/05/2008 05:17 PM

## 2018-04-17 ENCOUNTER — OFFICE VISIT (OUTPATIENT)
Dept: MEDICAL GROUP | Facility: CLINIC | Age: 58
End: 2018-04-17
Payer: COMMERCIAL

## 2018-04-17 VITALS
HEART RATE: 82 BPM | DIASTOLIC BLOOD PRESSURE: 84 MMHG | SYSTOLIC BLOOD PRESSURE: 122 MMHG | WEIGHT: 118 LBS | HEIGHT: 62 IN | OXYGEN SATURATION: 98 % | BODY MASS INDEX: 21.71 KG/M2 | TEMPERATURE: 97.2 F

## 2018-04-17 DIAGNOSIS — G44.89 OTHER HEADACHE SYNDROME: ICD-10-CM

## 2018-04-17 DIAGNOSIS — M54.2 NECK PAIN: ICD-10-CM

## 2018-04-17 PROCEDURE — 99214 OFFICE O/P EST MOD 30 MIN: CPT | Performed by: NURSE PRACTITIONER

## 2018-04-17 RX ORDER — BUTALBITAL, ACETAMINOPHEN AND CAFFEINE 300; 40; 50 MG/1; MG/1; MG/1
1-2 CAPSULE ORAL
Qty: 30 CAP | Refills: 0 | Status: SHIPPED
Start: 2018-04-17 | End: 2019-03-19

## 2018-04-17 RX ORDER — CYCLOBENZAPRINE HCL 10 MG
5-10 TABLET ORAL 3 TIMES DAILY PRN
Qty: 30 TAB | Refills: 0 | Status: SHIPPED | OUTPATIENT
Start: 2018-04-17
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
## 2018-04-19 ENCOUNTER — APPOINTMENT (OUTPATIENT)
Dept: DERMATOLOGY | Facility: IMAGING CENTER | Age: 58
End: 2018-04-19

## 2018-04-23 ENCOUNTER — PATIENT MESSAGE (OUTPATIENT)
Dept: MEDICAL GROUP | Facility: CLINIC | Age: 58
End: 2018-04-23

## 2018-04-23 DIAGNOSIS — G44.219 EPISODIC TENSION-TYPE HEADACHE, NOT INTRACTABLE: ICD-10-CM

## 2018-04-25 ENCOUNTER — HOSPITAL ENCOUNTER (OUTPATIENT)
Dept: RADIOLOGY | Facility: MEDICAL CENTER | Age: 58
End: 2018-04-25
Attending: NURSE PRACTITIONER
Payer: COMMERCIAL

## 2018-04-25 DIAGNOSIS — G44.219 EPISODIC TENSION-TYPE HEADACHE, NOT INTRACTABLE: ICD-10-CM

## 2018-04-25 PROCEDURE — 70544 MR ANGIOGRAPHY HEAD W/O DYE: CPT

## 2018-04-27 ENCOUNTER — APPOINTMENT (OUTPATIENT)
Dept: DERMATOLOGY | Facility: IMAGING CENTER | Age: 58
End: 2018-04-27

## 2018-06-05 ENCOUNTER — HOSPITAL ENCOUNTER (OUTPATIENT)
Dept: LAB | Facility: MEDICAL CENTER | Age: 58
End: 2018-06-05
Attending: FAMILY MEDICINE
Payer: COMMERCIAL

## 2018-06-05 ENCOUNTER — PATIENT MESSAGE (OUTPATIENT)
Dept: MEDICAL GROUP | Facility: PHYSICIAN GROUP | Age: 58
End: 2018-06-05

## 2018-06-05 ENCOUNTER — TELEPHONE (OUTPATIENT)
Dept: MEDICAL GROUP | Facility: PHYSICIAN GROUP | Age: 58
End: 2018-06-05

## 2018-06-05 ENCOUNTER — OFFICE VISIT (OUTPATIENT)
Dept: MEDICAL GROUP | Facility: PHYSICIAN GROUP | Age: 58
End: 2018-06-05
Payer: COMMERCIAL

## 2018-06-05 VITALS
BODY MASS INDEX: 21.16 KG/M2 | HEIGHT: 62 IN | SYSTOLIC BLOOD PRESSURE: 118 MMHG | HEART RATE: 74 BPM | DIASTOLIC BLOOD PRESSURE: 76 MMHG | WEIGHT: 115 LBS | OXYGEN SATURATION: 98 % | TEMPERATURE: 97.3 F | RESPIRATION RATE: 12 BRPM

## 2018-06-05 DIAGNOSIS — E87.6 HYPOKALEMIA: ICD-10-CM

## 2018-06-05 DIAGNOSIS — R90.89 ABNORMAL FINDING ON MRI OF BRAIN: ICD-10-CM

## 2018-06-05 DIAGNOSIS — G45.0 VERTEBROBASILAR ARTERY INSUFFICIENCY: ICD-10-CM

## 2018-06-05 DIAGNOSIS — R51.9 RECURRENT HEADACHE: ICD-10-CM

## 2018-06-05 LAB
ANION GAP SERPL CALC-SCNC: 9 MMOL/L (ref 0–11.9)
BUN SERPL-MCNC: 27 MG/DL (ref 8–22)
CALCIUM SERPL-MCNC: 9.6 MG/DL (ref 8.5–10.5)
CHLORIDE SERPL-SCNC: 101 MMOL/L (ref 96–112)
CO2 SERPL-SCNC: 28 MMOL/L (ref 20–33)
CREAT SERPL-MCNC: 0.84 MG/DL (ref 0.5–1.4)
GLUCOSE SERPL-MCNC: 97 MG/DL (ref 65–99)
POTASSIUM SERPL-SCNC: 3.4 MMOL/L (ref 3.6–5.5)
SODIUM SERPL-SCNC: 138 MMOL/L (ref 135–145)

## 2018-06-05 PROCEDURE — 80048 BASIC METABOLIC PNL TOTAL CA: CPT

## 2018-06-05 PROCEDURE — 99214 OFFICE O/P EST MOD 30 MIN: CPT | Performed by: FAMILY MEDICINE

## 2018-06-05 PROCEDURE — 36415 COLL VENOUS BLD VENIPUNCTURE: CPT

## 2018-06-05 RX ORDER — BUTALBITAL, ACETAMINOPHEN AND CAFFEINE 50; 325; 40 MG/1; MG/1; MG/1
1 TABLET ORAL EVERY 4 HOURS PRN
Qty: 30 TAB | Refills: 0 | Status: SHIPPED | OUTPATIENT
Start: 2018-06-05

## 2018-06-05 RX ORDER — POTASSIUM CHLORIDE 1500 MG/1
20 TABLET, EXTENDED RELEASE ORAL DAILY
Qty: 90 TAB | Refills: 3 | Status: SHIPPED | OUTPATIENT
Start: 2018-06-05 | End: 2018-09-07

## 2018-06-05 NOTE — TELEPHONE ENCOUNTER
Radiologist states this will cause artifact, but they may be able to read it. They would go ahead with the imaging and see what they are able to tell form there.

## 2018-06-05 NOTE — TELEPHONE ENCOUNTER
Regarding: RE: Procedure Question  Contact: [phone]  ----- Message from Maureen Vazquez M.D. sent at 6/5/2018  2:31 PM PDT -----       ----- Message from Asmita Angel to Maureen Vazquez M.D. sent at 6/5/2018  2:28 PM -----   Forever,  unfortunately!?? Would the MRA radiologists be able to tell us?    Dr Filemon Bustamante was the radiologist for my last MRA.    Thank you,    Alejandro Angel    ----- Message -----  From: Maureen Vazquez M.D.  Sent: 6/5/2018  2:09 PM PDT  To: Alesha Angel  Subject: RE: Procedure Question  That is a good question.  I think it will be less, but may still affect it.  How long must those be on?    Maureen    ----- Message -----     From: Alesha Angel     Sent: 6/5/2018  9:17 AM PDT       To: Maureen Vazquez M.D.  Subject: Procedure Question    Hi Dr. Vazquez,  Thank you for seeing me this morning! :)  I forgot to mention that I have permanent wire retainers (wires) in my mouth.  The upper one goes across the inside of the 4 front teeth, and the lower one goes across the inside of 6 front teeth.  They are just 2 small wires, will the MRA reading be affected by them at all?  I know they are a lot less obstructive, image-wise, as the braces.    Thank you and have a great day!    Alejandro Angel

## 2018-06-05 NOTE — TELEPHONE ENCOUNTER
Please call Radiology scheduling and see if patient's metal permanent retainer will cause artifact on the MRA we want.
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
## 2018-06-06 ENCOUNTER — PATIENT MESSAGE (OUTPATIENT)
Dept: MEDICAL GROUP | Facility: PHYSICIAN GROUP | Age: 58
End: 2018-06-06

## 2018-06-06 NOTE — TELEPHONE ENCOUNTER
Left a message for the patient to call us back for results or go onto her mychart to review results.

## 2018-06-06 NOTE — TELEPHONE ENCOUNTER
----- Message from Maureen Vazquez M.D. sent at 6/5/2018  5:23 PM PDT -----  Potasium is just a little low. Will increase Potassium to 20 meq daily.   Very dehydrated on labs.  Increase water intake.  Both are likely due to Hydrochlorothiazide.   We may need to consider stopping that medication.

## 2018-06-20 ENCOUNTER — HOSPITAL ENCOUNTER (OUTPATIENT)
Dept: RADIOLOGY | Facility: MEDICAL CENTER | Age: 58
End: 2018-06-20
Attending: FAMILY MEDICINE
Payer: COMMERCIAL

## 2018-06-20 DIAGNOSIS — R51.9 RECURRENT HEADACHE: ICD-10-CM

## 2018-06-20 DIAGNOSIS — R90.89 ABNORMAL FINDING ON MRI OF BRAIN: ICD-10-CM

## 2018-06-20 DIAGNOSIS — G45.0 VERTEBROBASILAR ARTERY INSUFFICIENCY: ICD-10-CM

## 2018-06-20 PROCEDURE — 70544 MR ANGIOGRAPHY HEAD W/O DYE: CPT

## 2018-06-22 ENCOUNTER — APPOINTMENT (OUTPATIENT)
Dept: DERMATOLOGY | Facility: IMAGING CENTER | Age: 58
End: 2018-06-22

## 2018-07-26 ENCOUNTER — PATIENT MESSAGE (OUTPATIENT)
Dept: MEDICAL GROUP | Facility: PHYSICIAN GROUP | Age: 58
End: 2018-07-26

## 2018-07-26 DIAGNOSIS — R73.02 IMPAIRED GLUCOSE TOLERANCE: ICD-10-CM

## 2018-07-26 NOTE — TELEPHONE ENCOUNTER
----- Message from Asmita Angel sent at 7/26/2018 11:25 AM PDT -----  Regarding: Prescription Question  Contact: [phone]  John Vazquez,  The tingling in my feet seems to be getting worse.  It used to be only a few hours before bedtime.  But it was really tingly last night, and I am still feeling it now.    I haven't really changed my eating habit - still low carb & an occasional dessert.  However, I have been eating 2 unripened bananas a day to help with my BM.  I read that green(er) bananas have less natural sugar so will not affect my blood sugar level as much.  Maybe this is not so.    I would like to keep my blood sugar in check so I don't become a diabetic.  Can you please write an Rx for a blood sugar test kit (w/o giving a specific brand so I can ask my pharmacist for the most accurate one to use with reasonable replacement test strip cost)?    Thank you!  Alejandro

## 2018-08-09 ENCOUNTER — APPOINTMENT (OUTPATIENT)
Dept: DERMATOLOGY | Facility: IMAGING CENTER | Age: 58
End: 2018-08-09

## 2018-09-07 ENCOUNTER — OFFICE VISIT (OUTPATIENT)
Dept: MEDICAL GROUP | Facility: PHYSICIAN GROUP | Age: 58
End: 2018-09-07
Payer: COMMERCIAL

## 2018-09-07 ENCOUNTER — HOSPITAL ENCOUNTER (OUTPATIENT)
Facility: MEDICAL CENTER | Age: 58
End: 2018-09-07
Attending: FAMILY MEDICINE
Payer: COMMERCIAL

## 2018-09-07 VITALS
WEIGHT: 115 LBS | OXYGEN SATURATION: 98 % | BODY MASS INDEX: 21.16 KG/M2 | HEIGHT: 62 IN | TEMPERATURE: 97.3 F | SYSTOLIC BLOOD PRESSURE: 126 MMHG | HEART RATE: 73 BPM | DIASTOLIC BLOOD PRESSURE: 74 MMHG | RESPIRATION RATE: 14 BRPM

## 2018-09-07 DIAGNOSIS — R30.0 DYSURIA: ICD-10-CM

## 2018-09-07 DIAGNOSIS — E87.6 HYPOKALEMIA: ICD-10-CM

## 2018-09-07 LAB
APPEARANCE UR: NORMAL
BILIRUB UR STRIP-MCNC: NEGATIVE MG/DL
COLOR UR AUTO: NORMAL
GLUCOSE UR STRIP.AUTO-MCNC: NEGATIVE MG/DL
KETONES UR STRIP.AUTO-MCNC: NEGATIVE MG/DL
LEUKOCYTE ESTERASE UR QL STRIP.AUTO: NORMAL
NITRITE UR QL STRIP.AUTO: POSITIVE
PH UR STRIP.AUTO: 7 [PH] (ref 5–8)
PROT UR QL STRIP: NEGATIVE MG/DL
RBC UR QL AUTO: NORMAL
SP GR UR STRIP.AUTO: 1.01
UROBILINOGEN UR STRIP-MCNC: 0.2 MG/DL

## 2018-09-07 PROCEDURE — 81002 URINALYSIS NONAUTO W/O SCOPE: CPT | Performed by: FAMILY MEDICINE

## 2018-09-07 PROCEDURE — 99214 OFFICE O/P EST MOD 30 MIN: CPT | Performed by: FAMILY MEDICINE

## 2018-09-07 PROCEDURE — 87186 SC STD MICRODIL/AGAR DIL: CPT

## 2018-09-07 PROCEDURE — 87086 URINE CULTURE/COLONY COUNT: CPT

## 2018-09-07 PROCEDURE — 87077 CULTURE AEROBIC IDENTIFY: CPT

## 2018-09-07 RX ORDER — NITROFURANTOIN 25; 75 MG/1; MG/1
100 CAPSULE ORAL 2 TIMES DAILY
Qty: 14 CAP | Refills: 0 | Status: SHIPPED | OUTPATIENT
Start: 2018-09-07 | End: 2018-09-14

## 2018-09-07 RX ORDER — POTASSIUM CHLORIDE 1.5 G/1.58G
20 POWDER, FOR SOLUTION ORAL DAILY
Qty: 90 PACKET | Refills: 11 | Status: SHIPPED | OUTPATIENT
Start: 2018-09-07 | End: 2018-10-12

## 2018-09-07 RX ORDER — POTASSIUM CHLORIDE 1.5 G/1.58G
20 POWDER, FOR SOLUTION ORAL 2 TIMES DAILY
Qty: 30 PACKET | Refills: 3 | Status: SHIPPED | OUTPATIENT
Start: 2018-09-07 | End: 2018-09-07 | Stop reason: SDUPTHER

## 2018-09-07 NOTE — PROGRESS NOTES
Chief Complaint   Patient presents with   • Dysuria     2 days       HISTORY OF PRESENT ILLNESS: Patient is a 58 y.o. female established patient here today for the following concerns:    1. Dysuria  Here for 2 days burning, frequency, and this am with gross hematuria.  Occurred after taking hot bath.  Started cranberry juice.     2. Hypokalemia  Reports that she is having trouble with her potassium tablets.  Wondering if she could get something easier to swallow.        Past Medical, Social, and Family history reviewed and updated in EPIC    Allergies:Ciprofloxacin    Current Outpatient Prescriptions   Medication Sig Dispense Refill   • nitrofurantoin monohydr macro (MACROBID) 100 MG Cap Take 1 Cap by mouth 2 times a day for 7 days. 14 Cap 0   • potassium chloride (KLOR-CON) 20 MEQ Pack Take 1 Packet by mouth every day. 90 Packet 11   • Blood Glucose Monitoring Suppl Supplies Misc Test strips order: Test strips for Abbott Freestyle Lite meter. Sig: use once daily and prn ssx high or low sugar #100 RF x 3 100 Each 3   • NON SPECIFIED Massage therapy every 2-3 weeks for 1 year 24 Each 0   • acetaminophen/caffeine/butalbital 325-40-50 mg (ESGIC) -40 MG Tab Take 1 Tab by mouth every four hours as needed for Headache. 30 Tab 0   • NON SPECIFIED Massage therapy 2-4 times per week 4 Each 11   • cyclobenzaprine (FLEXERIL) 10 MG Tab Take 0.5-1 Tabs by mouth 3 times a day as needed for Muscle Spasms (neck). 30 Tab 0   • acetaminophen/caffeine/butalbital 300-40-50 mg (FIORICET) -40 MG Cap capsule Take 1-2 Caps by mouth 1 time daily as needed for Headache or Migraine. 30 Cap 0   • hydroCHLOROthiazide (HYDRODIURIL) 12.5 MG tablet TAKE ONE TABLET BY MOUTH ONE TIME DAILY 90 Tab 1   • fenofibrate (TRICOR) 145 MG Tab Take 1 Tab by mouth every day. 90 Tab 3   • RESTASIS 0.05 % ophthalmic emulsion      • azelastine (ASTELIN) 137 MCG/SPRAY nasal spray      • bimatoprost (LUMIGAN) 0.03 % Solution Place 1 Drop in both eyes  "every evening.     • Azelastine-Fluticasone 137-50 MCG/ACT Suspension Spray  in nose.       No current facility-administered medications for this visit.          ROS:  Review of Systems   Constitutional: Negative for fever, chills, weight loss and malaise/fatigue.   HENT: Negative for ear pain, nosebleeds, congestion, sore throat and neck pain.    Eyes: Negative for blurred vision.   Respiratory: Negative for cough, sputum production, shortness of breath and wheezing.    Cardiovascular: Negative for chest pain, palpitations,  and leg swelling.   Gastrointestinal: Negative for heartburn, nausea, vomiting, diarrhea and abdominal pain.   Genitourinary: + for dysuria, urgency and frequency.   Musculoskeletal: Negative for myalgias, back pain and joint pain.   Skin: Negative for rash and itching.   Neurological: Negative for dizziness, tingling, tremors, sensory change, focal weakness and headaches.   Endo/Heme/Allergies: Does not bruise/bleed easily.   Psychiatric/Behavioral: Negative for depression, anxiety, suicidal ideas, insomnia and memory loss.      Exam:  Blood pressure 126/74, pulse 73, temperature 36.3 °C (97.3 °F), resp. rate 14, height 1.575 m (5' 2\"), weight 52.2 kg (115 lb), SpO2 98 %.    General:  Well nourished, well developed in NAD  Head is grossly normal.  Neck: Supple without JVD   Pulmonary:  Normal effort.   Cardiovascular: Regular rate  Extremities: no clubbing, cyanosis, or edema.  Psych: affect appropriate  No CVAT  Please note that this dictation was created using voice recognition software. I have made every reasonable attempt to correct obvious errors, but I expect that there are errors of grammar and possibly content that I did not discover before finalizing the note.    Assessment/Plan:  1. Dysuria  - POCT Urinalysis  - URINE CULTURE(NEW); Future  - nitrofurantoin monohydr macro (MACROBID) 100 MG Cap; Take 1 Cap by mouth 2 times a day for 7 days.  Dispense: 14 Cap; Refill: 0    2. " Hypokalemia  - potassium chloride (KLOR-CON) 20 MEQ Pack; Take 1 Packet by mouth every day.  Dispense: 90 Packet; Refill: 11

## 2018-09-10 LAB
BACTERIA UR CULT: ABNORMAL
BACTERIA UR CULT: ABNORMAL
SIGNIFICANT IND 70042: ABNORMAL
SITE SITE: ABNORMAL
SOURCE SOURCE: ABNORMAL

## 2018-10-02 RX ORDER — HYDROCHLOROTHIAZIDE 12.5 MG/1
TABLET ORAL
Qty: 90 TAB | Refills: 1 | Status: SHIPPED | OUTPATIENT
Start: 2018-10-02 | End: 2018-10-12

## 2018-10-02 NOTE — TELEPHONE ENCOUNTER
Refill X 6 months, sent to pharmacy.Pt. Seen in the last 6 months per protocol.   Lab Results   Component Value Date/Time    SODIUM 138 06/05/2018 08:36 AM    POTASSIUM 3.4 (L) 06/05/2018 08:36 AM    CHLORIDE 101 06/05/2018 08:36 AM    CO2 28 06/05/2018 08:36 AM    GLUCOSE 97 06/05/2018 08:36 AM    BUN 27 (H) 06/05/2018 08:36 AM    CREATININE 0.84 06/05/2018 08:36 AM    CREATININE 0.7 03/05/2008 05:17 PM

## 2018-10-02 NOTE — TELEPHONE ENCOUNTER
Was the patient seen in the last year in this department? Yes    Does patient have an active prescription for medications requested? No     Received Request Via: Pharmacy      Pt met protocol?: Yes    LAST OV 09/07/2018    BP Readings from Last 1 Encounters:   09/07/18 126/74     Lab Results   Component Value Date/Time    SODIUM 138 06/05/2018 08:36 AM    POTASSIUM 3.4 (L) 06/05/2018 08:36 AM    CHLORIDE 101 06/05/2018 08:36 AM    CO2 28 06/05/2018 08:36 AM    GLUCOSE 97 06/05/2018 08:36 AM    BUN 27 (H) 06/05/2018 08:36 AM    CREATININE 0.84 06/05/2018 08:36 AM    CREATININE 0.7 03/05/2008 05:17 PM

## 2018-10-12 ENCOUNTER — OFFICE VISIT (OUTPATIENT)
Dept: MEDICAL GROUP | Facility: PHYSICIAN GROUP | Age: 58
End: 2018-10-12
Payer: COMMERCIAL

## 2018-10-12 VITALS
HEART RATE: 88 BPM | BODY MASS INDEX: 21.16 KG/M2 | TEMPERATURE: 96.7 F | OXYGEN SATURATION: 95 % | HEIGHT: 62 IN | DIASTOLIC BLOOD PRESSURE: 70 MMHG | WEIGHT: 115 LBS | RESPIRATION RATE: 12 BRPM | SYSTOLIC BLOOD PRESSURE: 104 MMHG

## 2018-10-12 DIAGNOSIS — Z23 NEED FOR VACCINATION: ICD-10-CM

## 2018-10-12 DIAGNOSIS — E87.6 HYPOKALEMIA: ICD-10-CM

## 2018-10-12 DIAGNOSIS — R68.84 JAW PAIN: ICD-10-CM

## 2018-10-12 PROCEDURE — 99214 OFFICE O/P EST MOD 30 MIN: CPT | Mod: 25 | Performed by: FAMILY MEDICINE

## 2018-10-12 PROCEDURE — 90471 IMMUNIZATION ADMIN: CPT | Performed by: FAMILY MEDICINE

## 2018-10-12 PROCEDURE — 90686 IIV4 VACC NO PRSV 0.5 ML IM: CPT | Performed by: FAMILY MEDICINE

## 2018-10-12 NOTE — ASSESSMENT & PLAN NOTE
Reports that for the last 5 years had been having jaw pain that goes into the anterior chest that radiates to the back, stiff like pain.  6/10 pain.  Intermittent lasting 5 minutes or so.  No associated symptoms.  Had stress testing and cardiac work up with was unrevealing.  Has hx of cholecystectomy.

## 2018-10-12 NOTE — PROGRESS NOTES
"Chief Complaint   Patient presents with   • Jaw Pain     bilateral jaw pain   • Other     pt states \"sternum pain\"       HISTORY OF PRESENT ILLNESS: Patient is a 58 y.o. female established patient here today for the following concerns:    Jaw pain  Reports that for the last 5 years had been having jaw pain that goes into the anterior chest that radiates to the back, stiff like pain.  6/10 pain.  Intermittent lasting 5 minutes or so.  No associated symptoms.  Had stress testing and cardiac work up with was unrevealing.  Has hx of cholecystectomy.          Past Medical, Social, and Family history reviewed and updated in EPIC    Allergies:Ciprofloxacin    Current Outpatient Prescriptions   Medication Sig Dispense Refill   • Blood Glucose Monitoring Suppl Supplies Misc Test strips order: Test strips for Abbott Freestyle Lite meter. Sig: use once daily and prn ssx high or low sugar #100 RF x 3 100 Each 3   • NON SPECIFIED Massage therapy every 2-3 weeks for 1 year 24 Each 0   • acetaminophen/caffeine/butalbital 325-40-50 mg (ESGIC) -40 MG Tab Take 1 Tab by mouth every four hours as needed for Headache. 30 Tab 0   • NON SPECIFIED Massage therapy 2-4 times per week 4 Each 11   • cyclobenzaprine (FLEXERIL) 10 MG Tab Take 0.5-1 Tabs by mouth 3 times a day as needed for Muscle Spasms (neck). 30 Tab 0   • acetaminophen/caffeine/butalbital 300-40-50 mg (FIORICET) -40 MG Cap capsule Take 1-2 Caps by mouth 1 time daily as needed for Headache or Migraine. 30 Cap 0   • fenofibrate (TRICOR) 145 MG Tab Take 1 Tab by mouth every day. 90 Tab 3   • RESTASIS 0.05 % ophthalmic emulsion      • azelastine (ASTELIN) 137 MCG/SPRAY nasal spray      • bimatoprost (LUMIGAN) 0.03 % Solution Place 1 Drop in both eyes every evening.     • Azelastine-Fluticasone 137-50 MCG/ACT Suspension Spray  in nose.       No current facility-administered medications for this visit.          ROS:  Review of Systems   Constitutional: Negative for " "fever, chills, weight loss and malaise/fatigue.   HENT: Negative for ear pain, nosebleeds, congestion, sore throat and neck pain.    Eyes: Negative for blurred vision.   Respiratory: Negative for cough, sputum production, shortness of breath and wheezing.    Cardiovascular: Negative for chest pain, palpitations,  and leg swelling.   Gastrointestinal: Negative for heartburn, nausea, vomiting, diarrhea and abdominal pain.   Genitourinary: Negative for dysuria, urgency and frequency.   Musculoskeletal: Negative for myalgias, back pain and joint pain.   Skin: Negative for rash and itching.   Neurological: Negative for dizziness, tingling, tremors, sensory change, focal weakness and headaches.   Endo/Heme/Allergies: Does not bruise/bleed easily.   Psychiatric/Behavioral: Negative for depression, anxiety, suicidal ideas, insomnia and memory loss.      Exam:  Blood pressure 104/70, pulse 88, temperature 35.9 °C (96.7 °F), resp. rate 12, height 1.575 m (5' 2\"), weight 52.2 kg (115 lb), SpO2 95 %.    General:  Well nourished, well developed in NAD  Head is grossly normal.  Neck: Supple without JVD   Pulmonary:  Normal effort.   Cardiovascular: Regular rate  Extremities: no clubbing, cyanosis, or edema.  Psych: affect appropriate      Please note that this dictation was created using voice recognition software. I have made every reasonable attempt to correct obvious errors, but I expect that there are errors of grammar and possibly content that I did not discover before finalizing the note.    Assessment/Plan:  1. Need for vaccination  - Influenza Vaccine Quad Injection >3Y (PF)    2. Jaw pain  Suspect medication reaction    3. Hypokalemia  - BASIC METABOLIC PANEL; Future  D/c HCTZ (pressure is low normal) and potassium            "

## 2018-10-22 ENCOUNTER — HOSPITAL ENCOUNTER (OUTPATIENT)
Dept: RADIOLOGY | Facility: MEDICAL CENTER | Age: 58
End: 2018-10-22
Attending: OBSTETRICS & GYNECOLOGY
Payer: COMMERCIAL

## 2018-10-22 DIAGNOSIS — Z12.31 SCREENING MAMMOGRAM, ENCOUNTER FOR: ICD-10-CM

## 2018-10-22 PROCEDURE — 77067 SCR MAMMO BI INCL CAD: CPT

## 2018-11-16 ENCOUNTER — APPOINTMENT (OUTPATIENT)
Dept: DERMATOLOGY | Facility: IMAGING CENTER | Age: 58
End: 2018-11-16

## 2018-11-17 ENCOUNTER — HOSPITAL ENCOUNTER (OUTPATIENT)
Dept: LAB | Facility: MEDICAL CENTER | Age: 58
End: 2018-11-17
Attending: FAMILY MEDICINE
Payer: COMMERCIAL

## 2018-11-17 DIAGNOSIS — E87.6 HYPOKALEMIA: ICD-10-CM

## 2018-11-17 LAB
ANION GAP SERPL CALC-SCNC: 9 MMOL/L (ref 0–11.9)
BUN SERPL-MCNC: 26 MG/DL (ref 8–22)
CALCIUM SERPL-MCNC: 10 MG/DL (ref 8.5–10.5)
CHLORIDE SERPL-SCNC: 105 MMOL/L (ref 96–112)
CO2 SERPL-SCNC: 26 MMOL/L (ref 20–33)
CREAT SERPL-MCNC: 0.74 MG/DL (ref 0.5–1.4)
GLUCOSE SERPL-MCNC: 98 MG/DL (ref 65–99)
POTASSIUM SERPL-SCNC: 3.6 MMOL/L (ref 3.6–5.5)
SODIUM SERPL-SCNC: 140 MMOL/L (ref 135–145)

## 2018-11-17 PROCEDURE — 36415 COLL VENOUS BLD VENIPUNCTURE: CPT

## 2018-11-17 PROCEDURE — 80048 BASIC METABOLIC PNL TOTAL CA: CPT

## 2018-11-21 ENCOUNTER — OFFICE VISIT (OUTPATIENT)
Dept: MEDICAL GROUP | Facility: PHYSICIAN GROUP | Age: 58
End: 2018-11-21
Payer: COMMERCIAL

## 2018-11-21 VITALS
TEMPERATURE: 97.8 F | HEIGHT: 62 IN | SYSTOLIC BLOOD PRESSURE: 124 MMHG | OXYGEN SATURATION: 97 % | WEIGHT: 117 LBS | RESPIRATION RATE: 14 BRPM | BODY MASS INDEX: 21.53 KG/M2 | HEART RATE: 72 BPM | DIASTOLIC BLOOD PRESSURE: 78 MMHG

## 2018-11-21 DIAGNOSIS — H92.01 RIGHT EAR PAIN: ICD-10-CM

## 2018-11-21 DIAGNOSIS — B00.1 COLD SORE: ICD-10-CM

## 2018-11-21 DIAGNOSIS — R68.84 JAW PAIN: ICD-10-CM

## 2018-11-21 PROBLEM — J06.9 URI (UPPER RESPIRATORY INFECTION): Status: RESOLVED | Noted: 2018-01-03 | Resolved: 2018-11-21

## 2018-11-21 PROCEDURE — 99214 OFFICE O/P EST MOD 30 MIN: CPT | Performed by: FAMILY MEDICINE

## 2018-11-21 RX ORDER — ACYCLOVIR 400 MG/1
800 TABLET ORAL 2 TIMES DAILY
Qty: 28 TAB | Refills: 5 | Status: SHIPPED | OUTPATIENT
Start: 2018-11-21 | End: 2018-11-28

## 2018-11-21 NOTE — PROGRESS NOTES
Chief Complaint   Patient presents with   • Results     labs       HISTORY OF PRESENT ILLNESS: Patient is a 58 y.o. female established patient here today for the following concerns:      Here for follow upon labs.  Had normal potassium of the HCTZ.  Feeling much improved with resolution of jaw pain and muscle weakness and BP has stayed normotensive.  She has been experiencing some unusual warmth like sensation in the right ear canal that lasts seconds to minutes and then resolves.  No change in hearing, no tinnitus.  She has developed a cold sore on the lower lip about this time.  She would like to be considered for acyclovir for treatment when it comes on.        Past Medical, Social, and Family history reviewed and updated in EPIC    Allergies:Ciprofloxacin    Current Outpatient Prescriptions   Medication Sig Dispense Refill   • acyclovir (ZOVIRAX) 400 MG tablet Take 2 Tabs by mouth 2 times a day for 7 days. 28 Tab 5   • Blood Glucose Monitoring Suppl Supplies Misc Test strips order: Test strips for Abbott Freestyle Lite meter. Sig: use once daily and prn ssx high or low sugar #100 RF x 3 100 Each 3   • NON SPECIFIED Massage therapy every 2-3 weeks for 1 year 24 Each 0   • acetaminophen/caffeine/butalbital 325-40-50 mg (ESGIC) -40 MG Tab Take 1 Tab by mouth every four hours as needed for Headache. 30 Tab 0   • NON SPECIFIED Massage therapy 2-4 times per week 4 Each 11   • acetaminophen/caffeine/butalbital 300-40-50 mg (FIORICET) -40 MG Cap capsule Take 1-2 Caps by mouth 1 time daily as needed for Headache or Migraine. 30 Cap 0   • fenofibrate (TRICOR) 145 MG Tab Take 1 Tab by mouth every day. 90 Tab 3   • RESTASIS 0.05 % ophthalmic emulsion      • azelastine (ASTELIN) 137 MCG/SPRAY nasal spray      • bimatoprost (LUMIGAN) 0.03 % Solution Place 1 Drop in both eyes every evening.     • Azelastine-Fluticasone 137-50 MCG/ACT Suspension Spray  in nose.     • cyclobenzaprine (FLEXERIL) 10 MG Tab Take 0.5-1  "Tabs by mouth 3 times a day as needed for Muscle Spasms (neck). 30 Tab 0     No current facility-administered medications for this visit.          ROS:  Review of Systems   Constitutional: Negative for fever, chills, weight loss and malaise/fatigue.   HENT: Negative for ear pain, nosebleeds, congestion, sore throat and neck pain.    Eyes: Negative for blurred vision.   Respiratory: Negative for cough, sputum production, shortness of breath and wheezing.    Cardiovascular: Negative for chest pain, palpitations,  and leg swelling.   Gastrointestinal: Negative for heartburn, nausea, vomiting, diarrhea and abdominal pain.   Genitourinary: Negative for dysuria, urgency and frequency.   Musculoskeletal: Negative for myalgias, back pain and joint pain.   Skin: Negative for rash and itching.   Neurological: Negative for dizziness, tingling, tremors,+ sensory change,no focal weakness and headaches.   Endo/Heme/Allergies: Does not bruise/bleed easily.   Psychiatric/Behavioral: Negative for depression, anxiety, suicidal ideas, insomnia and memory loss.      Exam:  Blood pressure 124/78, pulse 72, temperature 36.6 °C (97.8 °F), resp. rate 14, height 1.575 m (5' 2\"), weight 53.1 kg (117 lb), SpO2 97 %.    General:  Well nourished, well developed in NAD  Head is grossly normal.  HEENT: Normocephalic and atraumatic. TMs clear bilaterally. Oropharynx is clear without erythema and no tonsillar exudate. Nasal mucosa pink with minimal rhinorrhea.  Neck: Supple without JVD   Pulmonary:  Normal effort. CTAB  Cardiovascular: Regular rate and rhythm   Extremities: no clubbing, cyanosis, or edema.  Psych: affect appropriate      Please note that this dictation was created using voice recognition software. I have made every reasonable attempt to correct obvious errors, but I expect that there are errors of grammar and possibly content that I did not discover before finalizing the note.    Assessment/Plan:  1. Cold sore  Acyclovir start " today and prn     2. Right ear pain  Query possible shingles or viral etiology.  If worsening will consider additional work up.  No other branches of the trigeminal nerve appear to be affects.     3. Jaw pain  Resolved.     HTN is also resolved as issue.     Follow up as needed.

## 2018-11-26 ENCOUNTER — OFFICE VISIT (OUTPATIENT)
Dept: URGENT CARE | Facility: PHYSICIAN GROUP | Age: 58
End: 2018-11-26
Payer: COMMERCIAL

## 2018-11-26 VITALS
SYSTOLIC BLOOD PRESSURE: 110 MMHG | DIASTOLIC BLOOD PRESSURE: 74 MMHG | HEART RATE: 87 BPM | OXYGEN SATURATION: 95 % | TEMPERATURE: 98.4 F | RESPIRATION RATE: 12 BRPM | WEIGHT: 117 LBS | HEIGHT: 62 IN | BODY MASS INDEX: 21.53 KG/M2

## 2018-11-26 DIAGNOSIS — R31.9 URINARY TRACT INFECTION WITH HEMATURIA, SITE UNSPECIFIED: Primary | ICD-10-CM

## 2018-11-26 DIAGNOSIS — N39.0 URINARY TRACT INFECTION WITH HEMATURIA, SITE UNSPECIFIED: Primary | ICD-10-CM

## 2018-11-26 DIAGNOSIS — R30.0 DYSURIA: ICD-10-CM

## 2018-11-26 LAB
APPEARANCE UR: CLEAR
BILIRUB UR STRIP-MCNC: NEGATIVE MG/DL
COLOR UR AUTO: YELLOW
GLUCOSE UR STRIP.AUTO-MCNC: NEGATIVE MG/DL
KETONES UR STRIP.AUTO-MCNC: NEGATIVE MG/DL
LEUKOCYTE ESTERASE UR QL STRIP.AUTO: NEGATIVE
NITRITE UR QL STRIP.AUTO: NEGATIVE
PH UR STRIP.AUTO: 5.5 [PH] (ref 5–8)
PROT UR QL STRIP: NEGATIVE MG/DL
RBC UR QL AUTO: NORMAL
SP GR UR STRIP.AUTO: 1.03
UROBILINOGEN UR STRIP-MCNC: 0.2 MG/DL

## 2018-11-26 PROCEDURE — 81002 URINALYSIS NONAUTO W/O SCOPE: CPT | Performed by: NURSE PRACTITIONER

## 2018-11-26 PROCEDURE — 99214 OFFICE O/P EST MOD 30 MIN: CPT | Performed by: NURSE PRACTITIONER

## 2018-11-26 RX ORDER — SULFAMETHOXAZOLE AND TRIMETHOPRIM 800; 160 MG/1; MG/1
1 TABLET ORAL 2 TIMES DAILY
Qty: 6 TAB | Refills: 0 | Status: SHIPPED | OUTPATIENT
Start: 2018-11-26 | End: 2018-11-29

## 2018-11-26 ASSESSMENT — ENCOUNTER SYMPTOMS
VOMITING: 0
GASTROINTESTINAL NEGATIVE: 1
SHORTNESS OF BREATH: 0
RESPIRATORY NEGATIVE: 1
FEVER: 0
PSYCHIATRIC NEGATIVE: 1
NAUSEA: 0
EYES NEGATIVE: 1
NEUROLOGICAL NEGATIVE: 1
BACK PAIN: 0
CARDIOVASCULAR NEGATIVE: 1
ABDOMINAL PAIN: 0
FLANK PAIN: 0

## 2018-11-27 NOTE — PROGRESS NOTES
Subjective:   Asmita Angel is a 58 y.o. female who presents for Urinary Frequency (Burning while urinating x2weeks )     Urinary Frequency   This is a new problem. Episode onset: 2 weeks ago. The problem has been gradually worsening. Pertinent negatives include no abdominal pain, fever, nausea or vomiting. Treatments tried: Cranberry juice.   Dysuria    This is a new problem. Episode onset: 2 weeks ago. The problem occurs every urination. The quality of the pain is described as burning. The pain is mild. There has been no fever. Associated symptoms include frequency and urgency. Pertinent negatives include no flank pain, hematuria, hesitancy, nausea or vomiting. Treatments tried: Cranberry juice. Her past medical history is significant for recurrent UTIs.   Also reports urinary urgency.    PMH:  has a past medical history of Allergic rhinitis due to pollen; Allergy; Cervicalgia; Essential hypertension, benign; Glaucoma; and Mixed hyperlipidemia.    MEDS:   Current Outpatient Prescriptions:   •  sulfamethoxazole-trimethoprim (BACTRIM DS) 800-160 MG tablet, Take 1 Tab by mouth 2 times a day for 3 days., Disp: 6 Tab, Rfl: 0  •  acyclovir (ZOVIRAX) 400 MG tablet, Take 2 Tabs by mouth 2 times a day for 7 days., Disp: 28 Tab, Rfl: 5  •  Blood Glucose Monitoring Suppl Supplies Misc, Test strips order: Test strips for Abbott Freestyle Lite meter. Sig: use once daily and prn ssx high or low sugar #100 RF x 3, Disp: 100 Each, Rfl: 3  •  NON SPECIFIED, Massage therapy every 2-3 weeks for 1 year, Disp: 24 Each, Rfl: 0  •  acetaminophen/caffeine/butalbital 325-40-50 mg (ESGIC) -40 MG Tab, Take 1 Tab by mouth every four hours as needed for Headache., Disp: 30 Tab, Rfl: 0  •  NON SPECIFIED, Massage therapy 2-4 times per week, Disp: 4 Each, Rfl: 11  •  cyclobenzaprine (FLEXERIL) 10 MG Tab, Take 0.5-1 Tabs by mouth 3 times a day as needed for Muscle Spasms (neck)., Disp: 30 Tab, Rfl: 0  •   "acetaminophen/caffeine/butalbital 300-40-50 mg (FIORICET) -40 MG Cap capsule, Take 1-2 Caps by mouth 1 time daily as needed for Headache or Migraine., Disp: 30 Cap, Rfl: 0  •  fenofibrate (TRICOR) 145 MG Tab, Take 1 Tab by mouth every day., Disp: 90 Tab, Rfl: 3  •  RESTASIS 0.05 % ophthalmic emulsion, , Disp: , Rfl:   •  azelastine (ASTELIN) 137 MCG/SPRAY nasal spray, , Disp: , Rfl:   •  bimatoprost (LUMIGAN) 0.03 % Solution, Place 1 Drop in both eyes every evening., Disp: , Rfl:   •  Azelastine-Fluticasone 137-50 MCG/ACT Suspension, Spray  in nose., Disp: , Rfl:     ALLERGIES:   Allergies   Allergen Reactions   • Ciprofloxacin Hives     itching     SURGHX:   Past Surgical History:   Procedure Laterality Date   • CHOLECYSTECTOMY  2007   • HYSTERECTOMY, TOTAL ABDOMINAL  2004    endometriosis   • ABDOMINAL HYSTERECTOMY TOTAL      endometriosis   • SINUSOTOMIES  2001,2006     SOCHX:  reports that she has never smoked. She has never used smokeless tobacco. She reports that she does not drink alcohol or use drugs.    FH: family history includes Diabetes in her brother and sister; Hyperlipidemia in her father; Hypertension in her father.    Review of Systems   Constitutional: Negative for fever and malaise/fatigue.   HENT: Negative.    Eyes: Negative.    Respiratory: Negative.  Negative for shortness of breath.    Cardiovascular: Negative.    Gastrointestinal: Negative.  Negative for abdominal pain, nausea and vomiting.   Genitourinary: Positive for dysuria, frequency and urgency. Negative for flank pain, hematuria and hesitancy.   Musculoskeletal: Negative for back pain.   Skin: Negative.    Neurological: Negative.    Psychiatric/Behavioral: Negative.    All other systems reviewed and are negative.     Objective:   /74   Pulse 87   Temp 36.9 °C (98.4 °F) (Temporal)   Resp 12   Ht 1.575 m (5' 2\")   Wt 53.1 kg (117 lb)   SpO2 95%   BMI 21.40 kg/m²      Physical Exam   Constitutional: She is oriented to " person, place, and time. She appears well-developed and well-nourished. No distress.   HENT:   Right Ear: External ear normal.   Left Ear: External ear normal.   Eyes: EOM are normal.   Neck: Normal range of motion.   Cardiovascular: Normal rate, regular rhythm, normal heart sounds and intact distal pulses.    Pulmonary/Chest: Effort normal and breath sounds normal. No respiratory distress.   Abdominal: Soft. Bowel sounds are normal. There is no tenderness. There is no rebound.   Musculoskeletal: Normal range of motion.   Neurological: She is alert and oriented to person, place, and time. No sensory deficit.   Skin: Skin is warm and dry. Capillary refill takes less than 2 seconds.   Psychiatric: She has a normal mood and affect.   Vitals reviewed.       Assessment/Plan:   1. Urinary tract infection with hematuria, site unspecified  - sulfamethoxazole-trimethoprim (BACTRIM DS) 800-160 MG tablet; Take 1 Tab by mouth 2 times a day for 3 days.  Dispense: 6 Tab; Refill: 0    2. Dysuria  - POCT Urinalysis    Trace blood in urine. Hx of recurrent UTI. Dysuria and urinary urgency x 2 weeks. Will treat for UTI.    Return for 1) Symptoms that change or worsen, or go to the ER, 2) Follow up with primary care.    Differential diagnosis, natural history, supportive care, and indications for immediate follow-up discussed. All questions answered. Patient agrees with the plan of care.    I have reviewed and agree with history, assessment and plan for office encounter on 11/26/2018 with midlevel provider: Devi. Suggested changes to plan or follow-up: none Otis Oh M.D.

## 2018-12-06 DIAGNOSIS — E78.2 MIXED HYPERLIPIDEMIA: ICD-10-CM

## 2018-12-06 NOTE — TELEPHONE ENCOUNTER
Was the patient seen in the last year in this department? Yes    Does patient have an active prescription for medications requested? No     Received Request Via: Pharmacy      Pt met protocol?: Yes    OV 11/18

## 2018-12-07 RX ORDER — FENOFIBRATE 145 MG/1
TABLET, COATED ORAL
Qty: 90 TAB | Refills: 1 | Status: SHIPPED | OUTPATIENT
Start: 2018-12-07 | End: 2019-07-03 | Stop reason: SDUPTHER

## 2018-12-07 NOTE — TELEPHONE ENCOUNTER
Pt has had OV within the 12 month protocol and lipid panel is current. 6 month supply sent to pharmacy.   Lab Results   Component Value Date/Time    CHOLSTRLTOT 155 02/16/2018 10:14 AM    LDL 89 02/16/2018 10:14 AM    HDL 53 02/16/2018 10:14 AM    TRIGLYCERIDE 64 02/16/2018 10:14 AM       Lab Results   Component Value Date/Time    SODIUM 140 11/17/2018 10:12 AM    POTASSIUM 3.6 11/17/2018 10:12 AM    CHLORIDE 105 11/17/2018 10:12 AM    CO2 26 11/17/2018 10:12 AM    GLUCOSE 98 11/17/2018 10:12 AM    BUN 26 (H) 11/17/2018 10:12 AM    CREATININE 0.74 11/17/2018 10:12 AM    CREATININE 0.7 03/05/2008 05:17 PM     Lab Results   Component Value Date/Time    ALKPHOSPHAT 44 02/16/2018 10:14 AM    ASTSGOT 27 02/16/2018 10:14 AM    ALTSGPT 23 02/16/2018 10:14 AM    TBILIRUBIN 0.4 02/16/2018 10:14 AM

## 2019-02-01 ENCOUNTER — APPOINTMENT (OUTPATIENT)
Dept: DERMATOLOGY | Facility: IMAGING CENTER | Age: 59
End: 2019-02-01

## 2019-03-15 ENCOUNTER — NON-PROVIDER VISIT (OUTPATIENT)
Dept: MEDICAL GROUP | Facility: PHYSICIAN GROUP | Age: 59
End: 2019-03-15
Payer: COMMERCIAL

## 2019-03-15 VITALS — SYSTOLIC BLOOD PRESSURE: 140 MMHG | DIASTOLIC BLOOD PRESSURE: 92 MMHG

## 2019-03-15 NOTE — PROGRESS NOTES
Chanamarv Alejandro Angel is a 58 y.o. female here for a non-provider visit for blood pressure check.    Vitals:    03/15/19 1058   BP: 140/92   BP Location: Right arm   Patient Position: Sitting   BP Cuff Size: Adult     If abnormal, was an in office provider notified today? No  Routed to PCP? Yes

## 2019-03-19 ENCOUNTER — OFFICE VISIT (OUTPATIENT)
Dept: MEDICAL GROUP | Facility: PHYSICIAN GROUP | Age: 59
End: 2019-03-19
Payer: COMMERCIAL

## 2019-03-19 VITALS
SYSTOLIC BLOOD PRESSURE: 120 MMHG | BODY MASS INDEX: 20.98 KG/M2 | HEART RATE: 84 BPM | HEIGHT: 62 IN | DIASTOLIC BLOOD PRESSURE: 78 MMHG | WEIGHT: 114 LBS | TEMPERATURE: 97.7 F | OXYGEN SATURATION: 95 % | RESPIRATION RATE: 16 BRPM

## 2019-03-19 DIAGNOSIS — I10 ESSENTIAL HYPERTENSION: ICD-10-CM

## 2019-03-19 PROCEDURE — 99214 OFFICE O/P EST MOD 30 MIN: CPT | Performed by: FAMILY MEDICINE

## 2019-03-19 RX ORDER — BENAZEPRIL HYDROCHLORIDE 5 MG/1
5 TABLET ORAL DAILY
Qty: 90 TAB | Refills: 3 | Status: SHIPPED | OUTPATIENT
Start: 2019-03-19 | End: 2019-05-07 | Stop reason: SDUPTHER

## 2019-03-19 ASSESSMENT — PATIENT HEALTH QUESTIONNAIRE - PHQ9: CLINICAL INTERPRETATION OF PHQ2 SCORE: 0

## 2019-03-19 NOTE — PROGRESS NOTES
Chief Complaint   Patient presents with   • Hypertension       HISTORY OF PRESENT ILLNESS: Patient is a 58 y.o. female established patient here today for the following concerns:    1. Essential hypertension  Here for follow up on BP.  Was previously on HCTZ but kept having low potassium and vertigo.  She was taken off of it and has done well but has a return of her BP creeping back up into the 140-160/90s at home.  Here today is much better. No CP, SOB, or leg swelling.     Past Medical, Social, and Family history reviewed and updated in EPIC    Allergies:Ciprofloxacin    Current Outpatient Prescriptions   Medication Sig Dispense Refill   • RaNITidine HCl (RANITIDINE ACID REDUCER PO) Take  by mouth.     • benazepril (LOTENSIN) 5 MG Tab Take 1 Tab by mouth every day. 90 Tab 3   • fenofibrate (TRICOR) 145 MG Tab Take one tablet by mouth one time daily 90 Tab 1   • Blood Glucose Monitoring Suppl Supplies Misc Test strips order: Test strips for Abbott Freestyle Lite meter. Sig: use once daily and prn ssx high or low sugar #100 RF x 3 100 Each 3   • NON SPECIFIED Massage therapy every 2-3 weeks for 1 year 24 Each 0   • NON SPECIFIED Massage therapy 2-4 times per week 4 Each 11   • RESTASIS 0.05 % ophthalmic emulsion      • azelastine (ASTELIN) 137 MCG/SPRAY nasal spray      • bimatoprost (LUMIGAN) 0.03 % Solution Place 1 Drop in both eyes every evening.     • acetaminophen/caffeine/butalbital 325-40-50 mg (ESGIC) -40 MG Tab Take 1 Tab by mouth every four hours as needed for Headache. 30 Tab 0   • cyclobenzaprine (FLEXERIL) 10 MG Tab Take 0.5-1 Tabs by mouth 3 times a day as needed for Muscle Spasms (neck). 30 Tab 0     No current facility-administered medications for this visit.          ROS:  Review of Systems   Constitutional: Negative for fever, chills, weight loss and malaise/fatigue.   HENT: Negative for ear pain, nosebleeds, congestion, sore throat and neck pain.    Eyes: Negative for blurred vision.  "  Respiratory: Negative for cough, sputum production, shortness of breath and wheezing.    Cardiovascular: Negative for chest pain, palpitations,  and leg swelling.   Gastrointestinal: Negative for heartburn, nausea, vomiting, diarrhea and abdominal pain.   Genitourinary: Negative for dysuria, urgency and frequency.   Musculoskeletal: Negative for myalgias, back pain and joint pain.   Skin: Negative for rash and itching.   Neurological: Negative for dizziness, tingling, tremors, sensory change, focal weakness and headaches.   Endo/Heme/Allergies: Does not bruise/bleed easily.   Psychiatric/Behavioral: Negative for depression, anxiety, suicidal ideas, insomnia and memory loss.      Exam:  Blood pressure 120/78, pulse 84, temperature 36.5 °C (97.7 °F), resp. rate 16, height 1.575 m (5' 2\"), weight 51.7 kg (114 lb), SpO2 95 %.    General:  Well nourished, well developed in NAD  Head is grossly normal.  Neck: Supple without JVD   Pulmonary:  Normal effort.   Cardiovascular: Regular rate  Extremities: no clubbing, cyanosis, or edema.  Psych: affect appropriate      Please note that this dictation was created using voice recognition software. I have made every reasonable attempt to correct obvious errors, but I expect that there are errors of grammar and possibly content that I did not discover before finalizing the note.    Assessment/Plan:  1. Essential hypertension  Will start with caution   - benazepril (LOTENSIN) 5 MG Tab; Take 1 Tab by mouth every day.  Dispense: 90 Tab; Refill: 3  Will monitor for any hypotension, but also will monitor for cough, may have had ace cough years ago prior to our practice.     My chart BP and progress.         "

## 2019-04-09 ENCOUNTER — PATIENT MESSAGE (OUTPATIENT)
Dept: MEDICAL GROUP | Facility: PHYSICIAN GROUP | Age: 59
End: 2019-04-09

## 2019-04-09 DIAGNOSIS — Z13.29 SCREENING FOR ENDOCRINE DISORDER: ICD-10-CM

## 2019-04-09 DIAGNOSIS — I10 ESSENTIAL HYPERTENSION: ICD-10-CM

## 2019-04-09 DIAGNOSIS — R73.09 IMPAIRED GLUCOSE METABOLISM: ICD-10-CM

## 2019-04-09 DIAGNOSIS — Z13.6 SCREENING FOR CARDIOVASCULAR CONDITION: ICD-10-CM

## 2019-04-09 DIAGNOSIS — Z13.0 SCREENING FOR DEFICIENCY ANEMIA: ICD-10-CM

## 2019-04-19 ENCOUNTER — HOSPITAL ENCOUNTER (OUTPATIENT)
Dept: LAB | Facility: MEDICAL CENTER | Age: 59
End: 2019-04-19
Attending: FAMILY MEDICINE
Payer: COMMERCIAL

## 2019-04-19 DIAGNOSIS — I10 ESSENTIAL HYPERTENSION: ICD-10-CM

## 2019-04-19 DIAGNOSIS — Z13.6 SCREENING FOR CARDIOVASCULAR CONDITION: ICD-10-CM

## 2019-04-19 DIAGNOSIS — Z13.29 SCREENING FOR ENDOCRINE DISORDER: ICD-10-CM

## 2019-04-19 DIAGNOSIS — Z13.0 SCREENING FOR DEFICIENCY ANEMIA: ICD-10-CM

## 2019-04-19 DIAGNOSIS — R73.09 IMPAIRED GLUCOSE METABOLISM: ICD-10-CM

## 2019-04-19 LAB
25(OH)D3 SERPL-MCNC: 30 NG/ML (ref 30–100)
ALBUMIN SERPL BCP-MCNC: 4.2 G/DL (ref 3.2–4.9)
ALBUMIN/GLOB SERPL: 1.8 G/DL
ALP SERPL-CCNC: 51 U/L (ref 30–99)
ALT SERPL-CCNC: 18 U/L (ref 2–50)
ANION GAP SERPL CALC-SCNC: 7 MMOL/L (ref 0–11.9)
AST SERPL-CCNC: 20 U/L (ref 12–45)
BASOPHILS # BLD AUTO: 1.2 % (ref 0–1.8)
BASOPHILS # BLD: 0.05 K/UL (ref 0–0.12)
BILIRUB SERPL-MCNC: 0.5 MG/DL (ref 0.1–1.5)
BUN SERPL-MCNC: 22 MG/DL (ref 8–22)
CALCIUM SERPL-MCNC: 9.1 MG/DL (ref 8.5–10.5)
CHLORIDE SERPL-SCNC: 108 MMOL/L (ref 96–112)
CHOLEST SERPL-MCNC: 150 MG/DL (ref 100–199)
CO2 SERPL-SCNC: 28 MMOL/L (ref 20–33)
CREAT SERPL-MCNC: 0.69 MG/DL (ref 0.5–1.4)
CREAT UR-MCNC: 27.6 MG/DL
EOSINOPHIL # BLD AUTO: 0.09 K/UL (ref 0–0.51)
EOSINOPHIL NFR BLD: 2.1 % (ref 0–6.9)
ERYTHROCYTE [DISTWIDTH] IN BLOOD BY AUTOMATED COUNT: 42.9 FL (ref 35.9–50)
FASTING STATUS PATIENT QL REPORTED: NORMAL
GLOBULIN SER CALC-MCNC: 2.3 G/DL (ref 1.9–3.5)
GLUCOSE SERPL-MCNC: 108 MG/DL (ref 65–99)
HCT VFR BLD AUTO: 40.2 % (ref 37–47)
HDLC SERPL-MCNC: 52 MG/DL
HGB BLD-MCNC: 13.1 G/DL (ref 12–16)
IMM GRANULOCYTES # BLD AUTO: 0.01 K/UL (ref 0–0.11)
IMM GRANULOCYTES NFR BLD AUTO: 0.2 % (ref 0–0.9)
LDLC SERPL CALC-MCNC: 85 MG/DL
LYMPHOCYTES # BLD AUTO: 1.42 K/UL (ref 1–4.8)
LYMPHOCYTES NFR BLD: 33.9 % (ref 22–41)
MCH RBC QN AUTO: 30.8 PG (ref 27–33)
MCHC RBC AUTO-ENTMCNC: 32.6 G/DL (ref 33.6–35)
MCV RBC AUTO: 94.6 FL (ref 81.4–97.8)
MICROALBUMIN UR-MCNC: <0.7 MG/DL
MICROALBUMIN/CREAT UR: NORMAL MG/G (ref 0–30)
MONOCYTES # BLD AUTO: 0.31 K/UL (ref 0–0.85)
MONOCYTES NFR BLD AUTO: 7.4 % (ref 0–13.4)
NEUTROPHILS # BLD AUTO: 2.31 K/UL (ref 2–7.15)
NEUTROPHILS NFR BLD: 55.2 % (ref 44–72)
NRBC # BLD AUTO: 0 K/UL
NRBC BLD-RTO: 0 /100 WBC
PLATELET # BLD AUTO: 273 K/UL (ref 164–446)
PMV BLD AUTO: 9.9 FL (ref 9–12.9)
POTASSIUM SERPL-SCNC: 4.1 MMOL/L (ref 3.6–5.5)
PROT SERPL-MCNC: 6.5 G/DL (ref 6–8.2)
RBC # BLD AUTO: 4.25 M/UL (ref 4.2–5.4)
SODIUM SERPL-SCNC: 143 MMOL/L (ref 135–145)
TRIGL SERPL-MCNC: 66 MG/DL (ref 0–149)
TSH SERPL DL<=0.005 MIU/L-ACNC: 0.81 UIU/ML (ref 0.38–5.33)
WBC # BLD AUTO: 4.2 K/UL (ref 4.8–10.8)

## 2019-04-19 PROCEDURE — 82043 UR ALBUMIN QUANTITATIVE: CPT

## 2019-04-19 PROCEDURE — 83036 HEMOGLOBIN GLYCOSYLATED A1C: CPT

## 2019-04-19 PROCEDURE — 82570 ASSAY OF URINE CREATININE: CPT

## 2019-04-19 PROCEDURE — 36415 COLL VENOUS BLD VENIPUNCTURE: CPT

## 2019-04-19 PROCEDURE — 85025 COMPLETE CBC W/AUTO DIFF WBC: CPT

## 2019-04-19 PROCEDURE — 80053 COMPREHEN METABOLIC PANEL: CPT

## 2019-04-19 PROCEDURE — 82306 VITAMIN D 25 HYDROXY: CPT

## 2019-04-19 PROCEDURE — 84443 ASSAY THYROID STIM HORMONE: CPT

## 2019-04-19 PROCEDURE — 80061 LIPID PANEL: CPT

## 2019-04-20 LAB
EST. AVERAGE GLUCOSE BLD GHB EST-MCNC: 131 MG/DL
HBA1C MFR BLD: 6.2 % (ref 0–5.6)

## 2019-04-22 ENCOUNTER — OFFICE VISIT (OUTPATIENT)
Dept: URGENT CARE | Facility: PHYSICIAN GROUP | Age: 59
End: 2019-04-22
Payer: COMMERCIAL

## 2019-04-22 ENCOUNTER — HOSPITAL ENCOUNTER (OUTPATIENT)
Facility: MEDICAL CENTER | Age: 59
End: 2019-04-22
Attending: PHYSICIAN ASSISTANT
Payer: COMMERCIAL

## 2019-04-22 VITALS
BODY MASS INDEX: 20.98 KG/M2 | HEIGHT: 62 IN | DIASTOLIC BLOOD PRESSURE: 78 MMHG | RESPIRATION RATE: 16 BRPM | OXYGEN SATURATION: 98 % | HEART RATE: 79 BPM | TEMPERATURE: 98.4 F | WEIGHT: 114 LBS | SYSTOLIC BLOOD PRESSURE: 118 MMHG

## 2019-04-22 DIAGNOSIS — R30.0 DYSURIA: ICD-10-CM

## 2019-04-22 DIAGNOSIS — N30.01 ACUTE CYSTITIS WITH HEMATURIA: Primary | ICD-10-CM

## 2019-04-22 DIAGNOSIS — N30.01 ACUTE CYSTITIS WITH HEMATURIA: ICD-10-CM

## 2019-04-22 LAB
APPEARANCE UR: NORMAL
BILIRUB UR STRIP-MCNC: NORMAL MG/DL
COLOR UR AUTO: NORMAL
GLUCOSE UR STRIP.AUTO-MCNC: NEGATIVE MG/DL
KETONES UR STRIP.AUTO-MCNC: NEGATIVE MG/DL
LEUKOCYTE ESTERASE UR QL STRIP.AUTO: NORMAL
NITRITE UR QL STRIP.AUTO: POSITIVE
PH UR STRIP.AUTO: 6 [PH] (ref 5–8)
PROT UR QL STRIP: 300 MG/DL
RBC UR QL AUTO: NORMAL
SP GR UR STRIP.AUTO: 1
UROBILINOGEN UR STRIP-MCNC: 0.2 MG/DL

## 2019-04-22 PROCEDURE — 87086 URINE CULTURE/COLONY COUNT: CPT

## 2019-04-22 PROCEDURE — 81002 URINALYSIS NONAUTO W/O SCOPE: CPT | Performed by: PHYSICIAN ASSISTANT

## 2019-04-22 PROCEDURE — 99214 OFFICE O/P EST MOD 30 MIN: CPT | Performed by: PHYSICIAN ASSISTANT

## 2019-04-22 RX ORDER — SULFAMETHOXAZOLE AND TRIMETHOPRIM 800; 160 MG/1; MG/1
1 TABLET ORAL EVERY 12 HOURS
Qty: 14 TAB | Refills: 0 | Status: SHIPPED | OUTPATIENT
Start: 2019-04-22 | End: 2019-04-29

## 2019-04-22 ASSESSMENT — ENCOUNTER SYMPTOMS
BACK PAIN: 0
FEVER: 0
FLANK PAIN: 0
NAUSEA: 0
CHILLS: 0
ABDOMINAL PAIN: 0

## 2019-04-23 NOTE — PROGRESS NOTES
Subjective:      Asmita Angel is a 58 y.o. female who presents with Painful Urination (blood in urine, frequent)    PMH:  has a past medical history of Allergic rhinitis due to pollen; Allergy; Cervicalgia; Essential hypertension, benign; Glaucoma; and Mixed hyperlipidemia.  MEDS:   Current Outpatient Prescriptions:   •  RaNITidine HCl (RANITIDINE ACID REDUCER PO), Take  by mouth., Disp: , Rfl:   •  benazepril (LOTENSIN) 5 MG Tab, Take 1 Tab by mouth every day., Disp: 90 Tab, Rfl: 3  •  fenofibrate (TRICOR) 145 MG Tab, Take one tablet by mouth one time daily, Disp: 90 Tab, Rfl: 1  •  Blood Glucose Monitoring Suppl Supplies Misc, Test strips order: Test strips for Abbott Freestyle Lite meter. Sig: use once daily and prn ssx high or low sugar #100 RF x 3, Disp: 100 Each, Rfl: 3  •  NON SPECIFIED, Massage therapy every 2-3 weeks for 1 year, Disp: 24 Each, Rfl: 0  •  acetaminophen/caffeine/butalbital 325-40-50 mg (ESGIC) -40 MG Tab, Take 1 Tab by mouth every four hours as needed for Headache., Disp: 30 Tab, Rfl: 0  •  NON SPECIFIED, Massage therapy 2-4 times per week, Disp: 4 Each, Rfl: 11  •  cyclobenzaprine (FLEXERIL) 10 MG Tab, Take 0.5-1 Tabs by mouth 3 times a day as needed for Muscle Spasms (neck)., Disp: 30 Tab, Rfl: 0  •  RESTASIS 0.05 % ophthalmic emulsion, , Disp: , Rfl:   •  azelastine (ASTELIN) 137 MCG/SPRAY nasal spray, , Disp: , Rfl:   •  bimatoprost (LUMIGAN) 0.03 % Solution, Place 1 Drop in both eyes every evening., Disp: , Rfl:   ALLERGIES:   Allergies   Allergen Reactions   • Ciprofloxacin Hives     itching     SURGHX:   Past Surgical History:   Procedure Laterality Date   • CHOLECYSTECTOMY  2007   • HYSTERECTOMY, TOTAL ABDOMINAL  2004    endometriosis   • ABDOMINAL HYSTERECTOMY TOTAL      endometriosis   • SINUSOTOMIES  2001,2006     SOCHX:  reports that she has never smoked. She has never used smokeless tobacco. She reports that she does not drink alcohol or use drugs.  FH: Reviewed  "with patient, not pertinent to this visit.           Patient presents with:  Painful Urination: blood in urine, frequency and urgency that began today. Pt denies fever, chills, flank pain or nausea. Pt denies hx of renal stones.           Dysuria    This is a new problem. The current episode started today. The problem occurs every urination. The problem has been rapidly worsening. The quality of the pain is described as burning. The pain is at a severity of 5/10. There has been no fever. She is sexually active. There is no history of pyelonephritis. Associated symptoms include frequency, hematuria and urgency. Pertinent negatives include no chills, flank pain, nausea or possible pregnancy. She has tried increased fluids and NSAIDs for the symptoms. The treatment provided mild relief.       Review of Systems   Constitutional: Negative for chills and fever.   Gastrointestinal: Negative for abdominal pain and nausea.   Genitourinary: Positive for dysuria, frequency, hematuria and urgency. Negative for flank pain.   Musculoskeletal: Negative for back pain.   All other systems reviewed and are negative.         Objective:     /78 (BP Location: Right arm, Patient Position: Sitting, BP Cuff Size: Adult)   Pulse 79   Temp 36.9 °C (98.4 °F)   Resp 16   Ht 1.575 m (5' 2\")   Wt 51.7 kg (114 lb)   SpO2 98%   BMI 20.85 kg/m²      Physical Exam   Constitutional: She is oriented to person, place, and time. She appears well-developed and well-nourished. No distress.   HENT:   Head: Normocephalic and atraumatic.   Nose: Nose normal.   Mouth/Throat: Oropharynx is clear and moist.   Eyes: Pupils are equal, round, and reactive to light. Conjunctivae and EOM are normal.   Neck: Normal range of motion. Neck supple.   Cardiovascular: Normal rate, regular rhythm and normal heart sounds.    Pulmonary/Chest: Effort normal and breath sounds normal.   Abdominal: Soft. Bowel sounds are normal. There is no rebound and no guarding. "   Musculoskeletal: Normal range of motion.   Neurological: She is alert and oriented to person, place, and time. Gait normal.   Skin: Skin is warm and dry. Capillary refill takes less than 2 seconds.   Psychiatric: She has a normal mood and affect.   Nursing note and vitals reviewed.         UA results interpreted by me: +nitrites, +LE, large blood, protein     Assessment/Plan:     1. Acute cystitis with hematuria  Urine Culture    sulfamethoxazole-trimethoprim (BACTRIM DS) 800-160 MG tablet    POCT Urinalysis   2. Dysuria  Urine Culture    sulfamethoxazole-trimethoprim (BACTRIM DS) 800-160 MG tablet    POCT Urinalysis     PT can continue OTC medications, increase fluids and rest until symptoms improve.     PT should follow up with PCP in 1-2 days for re-evaluation if symptoms have not improved.  Discussed red flags and reasons to return to UC or ED.  Pt and/or family verbalized understanding of diagnosis and follow up instructions and was offered informational handout on diagnosis.  PT discharged.

## 2019-05-07 ENCOUNTER — OFFICE VISIT (OUTPATIENT)
Dept: MEDICAL GROUP | Facility: PHYSICIAN GROUP | Age: 59
End: 2019-05-07
Payer: COMMERCIAL

## 2019-05-07 ENCOUNTER — HOSPITAL ENCOUNTER (OUTPATIENT)
Facility: MEDICAL CENTER | Age: 59
End: 2019-05-07
Attending: FAMILY MEDICINE
Payer: COMMERCIAL

## 2019-05-07 VITALS
WEIGHT: 115 LBS | TEMPERATURE: 98.3 F | HEIGHT: 62 IN | BODY MASS INDEX: 21.16 KG/M2 | DIASTOLIC BLOOD PRESSURE: 78 MMHG | OXYGEN SATURATION: 99 % | RESPIRATION RATE: 14 BRPM | SYSTOLIC BLOOD PRESSURE: 126 MMHG | HEART RATE: 66 BPM

## 2019-05-07 DIAGNOSIS — N39.0 RECURRENT UTI: ICD-10-CM

## 2019-05-07 DIAGNOSIS — I10 ESSENTIAL HYPERTENSION: ICD-10-CM

## 2019-05-07 DIAGNOSIS — R73.01 IMPAIRED FASTING GLUCOSE: ICD-10-CM

## 2019-05-07 LAB
APPEARANCE UR: CLEAR
BILIRUB UR STRIP-MCNC: NORMAL MG/DL
COLOR UR AUTO: YELLOW
GLUCOSE UR STRIP.AUTO-MCNC: NORMAL MG/DL
KETONES UR STRIP.AUTO-MCNC: NORMAL MG/DL
LEUKOCYTE ESTERASE UR QL STRIP.AUTO: NORMAL
NITRITE UR QL STRIP.AUTO: NORMAL
PH UR STRIP.AUTO: 5.5 [PH] (ref 5–8)
PROT UR QL STRIP: NORMAL MG/DL
RBC UR QL AUTO: NORMAL
SP GR UR STRIP.AUTO: 1.02
UROBILINOGEN UR STRIP-MCNC: 0.2 MG/DL

## 2019-05-07 PROCEDURE — 81002 URINALYSIS NONAUTO W/O SCOPE: CPT | Performed by: FAMILY MEDICINE

## 2019-05-07 PROCEDURE — 99214 OFFICE O/P EST MOD 30 MIN: CPT | Performed by: FAMILY MEDICINE

## 2019-05-07 PROCEDURE — 87086 URINE CULTURE/COLONY COUNT: CPT

## 2019-05-07 RX ORDER — NITROFURANTOIN 25; 75 MG/1; MG/1
CAPSULE ORAL
Qty: 30 CAP | Refills: 11 | Status: SHIPPED | OUTPATIENT
Start: 2019-05-07 | End: 2020-06-23 | Stop reason: SDUPTHER

## 2019-05-07 RX ORDER — BENAZEPRIL HYDROCHLORIDE 10 MG/1
10 TABLET ORAL DAILY
Qty: 90 TAB | Refills: 3 | Status: SHIPPED | OUTPATIENT
Start: 2019-05-07 | End: 2019-06-28 | Stop reason: SINTOL

## 2019-05-07 NOTE — PROGRESS NOTES
Chief Complaint   Patient presents with   • UTI     fv    • Hypertension       HISTORY OF PRESENT ILLNESS: Patient is a 58 y.o. female established patient here today for the following concerns:    1. Recurrent UTI  This is the first year that she has had a UTI just about every 3 months.  Wondering why.  She previously has had hysterectomy for endometriosis.  Was told not to use hormones for risk of allowing it to return.  She denies any change in hygiene habits.      2. Essential hypertension  Now on Benazepril 10 mg, BP well controlled.  Needs refill.  No CP, SOB, or cough.      3. Impaired fasting glucose  Noted previous labs,  Has made some adjustments to reducing excess fruit intake.  Diet is generally healthy otherwise.  No polyuria or polydipsia.  Last a1c was 6.2.        Past Medical, Social, and Family history reviewed and updated in EPIC    Allergies:Ciprofloxacin    Current Outpatient Prescriptions   Medication Sig Dispense Refill   • nitrofurantoin monohyd macro (MACROBID) 100 MG Cap Take 1 tab PO after intercourse, if having symptoms of UTI take BID for 7 days 30 Cap 11   • benazepril (LOTENSIN) 10 MG Tab Take 1 Tab by mouth every day. 90 Tab 3   • RaNITidine HCl (RANITIDINE ACID REDUCER PO) Take  by mouth.     • fenofibrate (TRICOR) 145 MG Tab Take one tablet by mouth one time daily 90 Tab 1   • Blood Glucose Monitoring Suppl Supplies Misc Test strips order: Test strips for Abbott Freestyle Lite meter. Sig: use once daily and prn ssx high or low sugar #100 RF x 3 100 Each 3   • RESTASIS 0.05 % ophthalmic emulsion      • azelastine (ASTELIN) 137 MCG/SPRAY nasal spray      • bimatoprost (LUMIGAN) 0.03 % Solution Place 1 Drop in both eyes every evening.     • NON SPECIFIED Massage therapy every 2-3 weeks for 1 year 24 Each 0   • acetaminophen/caffeine/butalbital 325-40-50 mg (ESGIC) -40 MG Tab Take 1 Tab by mouth every four hours as needed for Headache. 30 Tab 0   • NON SPECIFIED Massage therapy 2-4  "times per week 4 Each 11   • cyclobenzaprine (FLEXERIL) 10 MG Tab Take 0.5-1 Tabs by mouth 3 times a day as needed for Muscle Spasms (neck). 30 Tab 0     No current facility-administered medications for this visit.          ROS:  Review of Systems   Constitutional: Negative for fever, chills, weight loss and malaise/fatigue.   HENT: Negative for ear pain, nosebleeds, congestion, sore throat and neck pain.    Eyes: Negative for blurred vision.   Respiratory: Negative for cough, sputum production, shortness of breath and wheezing.    Cardiovascular: Negative for chest pain, palpitations,  and leg swelling.   Gastrointestinal: Negative for heartburn, nausea, vomiting, diarrhea and abdominal pain.   Genitourinary: Negative for dysuria, urgency and frequency.   Musculoskeletal: Negative for myalgias, back pain and joint pain.   Skin: Negative for rash and itching.   Neurological: Negative for dizziness, tingling, tremors, sensory change, focal weakness and headaches.   Endo/Heme/Allergies: Does not bruise/bleed easily.   Psychiatric/Behavioral: Negative for depression, anxiety, suicidal ideas, insomnia and memory loss.      Exam:  /78   Pulse 66   Temp 36.8 °C (98.3 °F)   Resp 14   Ht 1.575 m (5' 2\")   Wt 52.2 kg (115 lb)   SpO2 99%     General:  Well nourished, well developed in NAD  Head is grossly normal.  Neck: Supple without JVD   Pulmonary:  Normal effort.   Cardiovascular: Regular rate  Extremities: no clubbing, cyanosis, or edema.  Psych: affect appropriate      Please note that this dictation was created using voice recognition software. I have made every reasonable attempt to correct obvious errors, but I expect that there are errors of grammar and possibly content that I did not discover before finalizing the note.    Assessment/Plan:  1. Recurrent UTI    - POCT Urinalysis  - URINE CULTURE(NEW); Future  - nitrofurantoin monohyd macro (MACROBID) 100 MG Cap; Take 1 tab PO after intercourse, if having " symptoms of UTI take BID for 7 days  Dispense: 30 Cap; Refill: 11    2. Essential hypertension  - benazepril (LOTENSIN) 10 MG Tab; Take 1 Tab by mouth every day.  Dispense: 90 Tab; Refill: 3    3. Impaired fasting glucose  - Comp Metabolic Panel; Future  - Lipid Profile; Future  - HEMOGLOBIN A1C; Future  - MICROALBUMIN CREAT RATIO URINE; Future    Follow up in 6 months, sooner prn.

## 2019-05-09 LAB
BACTERIA UR CULT: NORMAL
SIGNIFICANT IND 70042: NORMAL
SITE SITE: NORMAL
SOURCE SOURCE: NORMAL

## 2019-06-28 ENCOUNTER — OFFICE VISIT (OUTPATIENT)
Dept: MEDICAL GROUP | Facility: PHYSICIAN GROUP | Age: 59
End: 2019-06-28
Payer: COMMERCIAL

## 2019-06-28 VITALS
DIASTOLIC BLOOD PRESSURE: 80 MMHG | WEIGHT: 112 LBS | BODY MASS INDEX: 20.61 KG/M2 | HEIGHT: 62 IN | HEART RATE: 75 BPM | OXYGEN SATURATION: 100 % | RESPIRATION RATE: 14 BRPM | TEMPERATURE: 100.1 F | SYSTOLIC BLOOD PRESSURE: 122 MMHG

## 2019-06-28 DIAGNOSIS — I10 ESSENTIAL HYPERTENSION: ICD-10-CM

## 2019-06-28 DIAGNOSIS — R19.5 LOOSE STOOLS: ICD-10-CM

## 2019-06-28 PROCEDURE — 99213 OFFICE O/P EST LOW 20 MIN: CPT | Performed by: FAMILY MEDICINE

## 2019-06-28 RX ORDER — LOSARTAN POTASSIUM 50 MG/1
50 TABLET ORAL DAILY
Qty: 90 TAB | Refills: 3 | Status: SHIPPED | OUTPATIENT
Start: 2019-06-28 | End: 2020-06-08

## 2019-06-28 NOTE — PROGRESS NOTES
Chief Complaint   Patient presents with   • Hypertension     B/P Med management    • Loose Stools     Bowel changes x 6 months        HISTORY OF PRESENT ILLNESS: Patient is a 59 y.o. female established patient here today for the following concerns:    1. Essential hypertension  Here for follow up on BP.  Has been controlled on Benazepril 10 mg, but still has cough, tickling.  NO HA, CP, leg swelling.  Hx of hypokalemia 2/2 to HCTZ.      2. Loose stools  Also reports she has been having loose stools for the last few months regularly.  Usually has hx of constipation and dry stools.  No dietary changes.  No abdominal pain.  No melena or hematochezia.        Past Medical, Social, and Family history reviewed and updated in EPIC    Allergies:Ciprofloxacin    Current Outpatient Prescriptions   Medication Sig Dispense Refill   • losartan (COZAAR) 50 MG Tab Take 1 Tab by mouth every day. 90 Tab 3   • nitrofurantoin monohyd macro (MACROBID) 100 MG Cap Take 1 tab PO after intercourse, if having symptoms of UTI take BID for 7 days 30 Cap 11   • RaNITidine HCl (RANITIDINE ACID REDUCER PO) Take  by mouth.     • fenofibrate (TRICOR) 145 MG Tab Take one tablet by mouth one time daily 90 Tab 1   • Blood Glucose Monitoring Suppl Supplies Misc Test strips order: Test strips for Abbott Freestyle Lite meter. Sig: use once daily and prn ssx high or low sugar #100 RF x 3 100 Each 3   • NON SPECIFIED Massage therapy every 2-3 weeks for 1 year 24 Each 0   • acetaminophen/caffeine/butalbital 325-40-50 mg (ESGIC) -40 MG Tab Take 1 Tab by mouth every four hours as needed for Headache. 30 Tab 0   • NON SPECIFIED Massage therapy 2-4 times per week 4 Each 11   • cyclobenzaprine (FLEXERIL) 10 MG Tab Take 0.5-1 Tabs by mouth 3 times a day as needed for Muscle Spasms (neck). 30 Tab 0   • RESTASIS 0.05 % ophthalmic emulsion      • azelastine (ASTELIN) 137 MCG/SPRAY nasal spray      • bimatoprost (LUMIGAN) 0.03 % Solution Place 1 Drop in both  "eyes every evening.       No current facility-administered medications for this visit.          ROS:  Review of Systems   Constitutional: Negative for fever, chills, weight loss and malaise/fatigue.   HENT: Negative for ear pain, nosebleeds, congestion, sore throat and neck pain.    Eyes: Negative for blurred vision.   Respiratory: Negative for cough, sputum production, shortness of breath and wheezing.    Cardiovascular: Negative for chest pain, palpitations,  and leg swelling.   Gastrointestinal: Negative for heartburn, nausea, vomiting, diarrhea and abdominal pain.   Genitourinary: Negative for dysuria, urgency and frequency.   Musculoskeletal: Negative for myalgias, back pain and joint pain.   Skin: Negative for rash and itching.   Neurological: Negative for dizziness, tingling, tremors, sensory change, focal weakness and headaches.   Endo/Heme/Allergies: Does not bruise/bleed easily.   Psychiatric/Behavioral: Negative for depression, anxiety, suicidal ideas, insomnia and memory loss.      Exam:  /80   Pulse 75   Temp 37.8 °C (100.1 °F)   Resp 14   Ht 1.575 m (5' 2\")   Wt 50.8 kg (112 lb)   SpO2 100%     General:  Well nourished, well developed in NAD  Head is grossly normal.  Neck: Supple without JVD   Pulmonary:  Normal effort.   Cardiovascular: Regular rate  Extremities: no clubbing, cyanosis, or edema.  Psych: affect appropriate      Please note that this dictation was created using voice recognition software. I have made every reasonable attempt to correct obvious errors, but I expect that there are errors of grammar and possibly content that I did not discover before finalizing the note.    Assessment/Plan:  1. Essential hypertension  D/c benazepril   - losartan (COZAAR) 50 MG Tab; Take 1 Tab by mouth every day.  Dispense: 90 Tab; Refill: 3  Home BP check    2. Loose stools  Observation, fiber and hydration.  May be change in bp meds from HCTZ to Benazepril    3-6 month follow up        "

## 2019-07-03 DIAGNOSIS — E78.2 MIXED HYPERLIPIDEMIA: ICD-10-CM

## 2019-07-05 RX ORDER — FENOFIBRATE 145 MG/1
TABLET, COATED ORAL
Qty: 90 TAB | Refills: 1 | Status: SHIPPED | OUTPATIENT
Start: 2019-07-05 | End: 2019-12-16

## 2019-07-05 NOTE — TELEPHONE ENCOUNTER
Pt has had OV within the 12 month protocol and lipid panel is current. 6 month supply sent to pharmacy.   Lab Results   Component Value Date/Time    CHOLSTRLTOT 150 04/19/2019 10:04 AM    LDL 85 04/19/2019 10:04 AM    HDL 52 04/19/2019 10:04 AM    TRIGLYCERIDE 66 04/19/2019 10:04 AM       Lab Results   Component Value Date/Time    SODIUM 143 04/19/2019 10:04 AM    POTASSIUM 4.1 04/19/2019 10:04 AM    CHLORIDE 108 04/19/2019 10:04 AM    CO2 28 04/19/2019 10:04 AM    GLUCOSE 108 (H) 04/19/2019 10:04 AM    BUN 22 04/19/2019 10:04 AM    CREATININE 0.69 04/19/2019 10:04 AM    CREATININE 0.7 03/05/2008 05:17 PM     Lab Results   Component Value Date/Time    ALKPHOSPHAT 51 04/19/2019 10:04 AM    ASTSGOT 20 04/19/2019 10:04 AM    ALTSGPT 18 04/19/2019 10:04 AM    TBILIRUBIN 0.5 04/19/2019 10:04 AM

## 2019-07-05 NOTE — TELEPHONE ENCOUNTER
Was the patient seen in the last year in this department? Yes    Does patient have an active prescription for medications requested? No     Received Request Via: Pharmacy      Pt met protocol?: Yes   Pt last ov 6/2019    Lab Results  Component Value Date/Time   CHOLSTRLTOT 150 04/19/2019 1004   TRIGLYCERIDE 66 04/19/2019 1004   HDL 52 04/19/2019 1004   LDL 85 04/19/2019 1004       Lab Results   Component Value Date/Time    HBA1C 6.2 (H) 04/19/2019 10:04 AM

## 2019-08-14 ENCOUNTER — OFFICE VISIT (OUTPATIENT)
Dept: URGENT CARE | Facility: PHYSICIAN GROUP | Age: 59
End: 2019-08-14
Payer: COMMERCIAL

## 2019-08-14 VITALS
RESPIRATION RATE: 16 BRPM | OXYGEN SATURATION: 98 % | SYSTOLIC BLOOD PRESSURE: 114 MMHG | HEART RATE: 95 BPM | HEIGHT: 62 IN | WEIGHT: 115 LBS | TEMPERATURE: 100.5 F | DIASTOLIC BLOOD PRESSURE: 68 MMHG | BODY MASS INDEX: 21.16 KG/M2

## 2019-08-14 DIAGNOSIS — J02.9 PHARYNGITIS, UNSPECIFIED ETIOLOGY: ICD-10-CM

## 2019-08-14 DIAGNOSIS — R50.9 FEVER, UNSPECIFIED FEVER CAUSE: ICD-10-CM

## 2019-08-14 LAB
APPEARANCE UR: CLEAR
BILIRUB UR STRIP-MCNC: NEGATIVE MG/DL
COLOR UR AUTO: YELLOW
FLUAV+FLUBV AG SPEC QL IA: NEGATIVE
GLUCOSE UR STRIP.AUTO-MCNC: NEGATIVE MG/DL
INT CON NEG: NEGATIVE
INT CON NEG: NEGATIVE
INT CON POS: POSITIVE
INT CON POS: POSITIVE
KETONES UR STRIP.AUTO-MCNC: NORMAL MG/DL
LEUKOCYTE ESTERASE UR QL STRIP.AUTO: NEGATIVE
NITRITE UR QL STRIP.AUTO: NEGATIVE
PH UR STRIP.AUTO: 6 [PH] (ref 5–8)
PROT UR QL STRIP: NEGATIVE MG/DL
RBC UR QL AUTO: NORMAL
S PYO AG THROAT QL: NEGATIVE
SP GR UR STRIP.AUTO: 1.01
UROBILINOGEN UR STRIP-MCNC: NORMAL MG/DL

## 2019-08-14 PROCEDURE — 87804 INFLUENZA ASSAY W/OPTIC: CPT | Performed by: FAMILY MEDICINE

## 2019-08-14 PROCEDURE — 87880 STREP A ASSAY W/OPTIC: CPT | Performed by: FAMILY MEDICINE

## 2019-08-14 PROCEDURE — 99214 OFFICE O/P EST MOD 30 MIN: CPT | Performed by: FAMILY MEDICINE

## 2019-08-14 PROCEDURE — 81002 URINALYSIS NONAUTO W/O SCOPE: CPT | Performed by: FAMILY MEDICINE

## 2019-08-14 ASSESSMENT — ENCOUNTER SYMPTOMS
VOMITING: 0
EYE DISCHARGE: 0
EYE REDNESS: 0
WEIGHT LOSS: 0
MYALGIAS: 0
NAUSEA: 0
SINUS PAIN: 0

## 2019-08-14 NOTE — LETTER
August 14, 2019         Patient: Asmita Angel   YOB: 1960   Date of Visit: 8/14/2019           To Whom it May Concern:    Asmita Angel was seen in my clinic on 8/14/2019. Please excuse 8/13 through 8/15/2019.      Sincerely,           Bonifacio Guaman M.D.  Electronically Signed

## 2019-08-14 NOTE — PROGRESS NOTES
"Subjective:      Alesha Angel is a 59 y.o. female who presents with Pharyngitis (chills, feverish, x 3 days)            2 days fever/chill, myalgia, HA, ST. No cough. Occas sneezing. No nausea. No diarrhea. No recent travel. No known insect bites. No known exposures. No OTC medications. Sx's are slowly improving. No other aggravating or alleviating factors.        Review of Systems   Constitutional: Positive for malaise/fatigue. Negative for weight loss.   HENT: Negative for ear discharge, ear pain and sinus pain.    Eyes: Negative for discharge and redness.   Cardiovascular: Negative for chest pain.   Gastrointestinal: Negative for nausea and vomiting.   Genitourinary:        Urinary sx's initially. Started nitrofurantoin that she is given to keep on hand for recurrent uti. Sx's have resolved.    Musculoskeletal: Negative for joint pain and myalgias.   Skin: Negative for itching and rash.     .  Medications, Allergies, and current problem list reviewed today in Epic       Objective:     /68 (BP Location: Left arm, Patient Position: Sitting, BP Cuff Size: Adult)   Pulse 95   Temp (!) 38.1 °C (100.5 °F) (Temporal)   Resp 16   Ht 1.575 m (5' 2\")   Wt 52.2 kg (115 lb)   SpO2 98%   BMI 21.03 kg/m²      Physical Exam   Constitutional: She is oriented to person, place, and time. She appears well-developed and well-nourished. No distress.   HENT:   Head: Normocephalic and atraumatic.   Right Ear: External ear normal.   Left Ear: External ear normal.   Mouth/Throat: Oropharynx is clear and moist.   Pharynx red without exudate   Eyes: Conjunctivae are normal.   Neck: Neck supple.   Cardiovascular: Normal rate, regular rhythm and normal heart sounds.   No murmur heard.  Pulmonary/Chest: Effort normal and breath sounds normal. She has no wheezes.   Abdominal: Soft. Bowel sounds are normal. There is no tenderness.   Genitourinary:   Genitourinary Comments: No suprapubic or CVAT   Lymphadenopathy:     She " has no cervical adenopathy.   Neurological: She is alert and oriented to person, place, and time.   Skin: Skin is warm and dry. No rash noted.               Assessment/Plan:   UA reviewed  poct strep neg  poct influenza neg    1. Pharyngitis, unspecified etiology  POCT Rapid Strep A   2. Fever, unspecified fever cause  POCT Rapid Strep A    POCT Urinalysis    POCT Influenza A/B     Differential diagnosis, natural history, supportive care, and indications for immediate follow-up discussed at length.

## 2019-11-05 ENCOUNTER — HOSPITAL ENCOUNTER (OUTPATIENT)
Dept: RADIOLOGY | Facility: MEDICAL CENTER | Age: 59
End: 2019-11-05
Attending: OBSTETRICS & GYNECOLOGY
Payer: COMMERCIAL

## 2019-11-05 DIAGNOSIS — Z12.31 SCREENING MAMMOGRAM, ENCOUNTER FOR: ICD-10-CM

## 2019-11-05 PROCEDURE — 77063 BREAST TOMOSYNTHESIS BI: CPT

## 2019-11-23 ENCOUNTER — HOSPITAL ENCOUNTER (OUTPATIENT)
Dept: LAB | Facility: MEDICAL CENTER | Age: 59
End: 2019-11-23
Attending: FAMILY MEDICINE
Payer: COMMERCIAL

## 2019-11-23 DIAGNOSIS — R73.01 IMPAIRED FASTING GLUCOSE: ICD-10-CM

## 2019-11-23 LAB
ALBUMIN SERPL BCP-MCNC: 4.1 G/DL (ref 3.2–4.9)
ALBUMIN/GLOB SERPL: 1.7 G/DL
ALP SERPL-CCNC: 43 U/L (ref 30–99)
ALT SERPL-CCNC: 22 U/L (ref 2–50)
ANION GAP SERPL CALC-SCNC: 7 MMOL/L (ref 0–11.9)
AST SERPL-CCNC: 23 U/L (ref 12–45)
BILIRUB SERPL-MCNC: 0.5 MG/DL (ref 0.1–1.5)
BUN SERPL-MCNC: 22 MG/DL (ref 8–22)
CALCIUM SERPL-MCNC: 8.7 MG/DL (ref 8.5–10.5)
CHLORIDE SERPL-SCNC: 107 MMOL/L (ref 96–112)
CHOLEST SERPL-MCNC: 139 MG/DL (ref 100–199)
CO2 SERPL-SCNC: 27 MMOL/L (ref 20–33)
CREAT SERPL-MCNC: 0.78 MG/DL (ref 0.5–1.4)
CREAT UR-MCNC: 17.7 MG/DL
EST. AVERAGE GLUCOSE BLD GHB EST-MCNC: 128 MG/DL
FASTING STATUS PATIENT QL REPORTED: NORMAL
GLOBULIN SER CALC-MCNC: 2.4 G/DL (ref 1.9–3.5)
GLUCOSE SERPL-MCNC: 96 MG/DL (ref 65–99)
HBA1C MFR BLD: 6.1 % (ref 0–5.6)
HDLC SERPL-MCNC: 52 MG/DL
LDLC SERPL CALC-MCNC: 76 MG/DL
MICROALBUMIN UR-MCNC: <0.7 MG/DL
MICROALBUMIN/CREAT UR: NORMAL MG/G (ref 0–30)
POTASSIUM SERPL-SCNC: 3.8 MMOL/L (ref 3.6–5.5)
PROT SERPL-MCNC: 6.5 G/DL (ref 6–8.2)
SODIUM SERPL-SCNC: 141 MMOL/L (ref 135–145)
TRIGL SERPL-MCNC: 55 MG/DL (ref 0–149)

## 2019-11-23 PROCEDURE — 82570 ASSAY OF URINE CREATININE: CPT

## 2019-11-23 PROCEDURE — 80053 COMPREHEN METABOLIC PANEL: CPT

## 2019-11-23 PROCEDURE — 82043 UR ALBUMIN QUANTITATIVE: CPT

## 2019-11-23 PROCEDURE — 83036 HEMOGLOBIN GLYCOSYLATED A1C: CPT

## 2019-11-23 PROCEDURE — 36415 COLL VENOUS BLD VENIPUNCTURE: CPT

## 2019-11-23 PROCEDURE — 80061 LIPID PANEL: CPT

## 2019-12-16 DIAGNOSIS — E78.2 MIXED HYPERLIPIDEMIA: ICD-10-CM

## 2019-12-16 RX ORDER — FENOFIBRATE 145 MG/1
TABLET, COATED ORAL
Qty: 90 TAB | Refills: 1 | Status: SHIPPED | OUTPATIENT
Start: 2019-12-16 | End: 2020-06-16

## 2019-12-16 NOTE — TELEPHONE ENCOUNTER
Was the patient seen in the last year in this department? Yes    Does patient have an active prescription for medications requested? No     Received Request Via: Pharmacy      Pt met protocol?: Yes   Pt last ov 6/2019   Lab Results   Component Value Date/Time    CHOLSTRLTOT 139 11/23/2019 0932    TRIGLYCERIDE 55 11/23/2019 0932    HDL 52 11/23/2019 0932    LDL 76 11/23/2019 0932

## 2019-12-16 NOTE — TELEPHONE ENCOUNTER
Pt has had OV within the 12 month protocol and lipid panel is current. 6 month supply sent to pharmacy.   Lab Results   Component Value Date/Time    CHOLSTRLTOT 139 11/23/2019 09:32 AM    LDL 76 11/23/2019 09:32 AM    HDL 52 11/23/2019 09:32 AM    TRIGLYCERIDE 55 11/23/2019 09:32 AM       Lab Results   Component Value Date/Time    SODIUM 141 11/23/2019 09:32 AM    POTASSIUM 3.8 11/23/2019 09:32 AM    CHLORIDE 107 11/23/2019 09:32 AM    CO2 27 11/23/2019 09:32 AM    GLUCOSE 96 11/23/2019 09:32 AM    BUN 22 11/23/2019 09:32 AM    CREATININE 0.78 11/23/2019 09:32 AM    CREATININE 0.7 03/05/2008 05:17 PM     Lab Results   Component Value Date/Time    ALKPHOSPHAT 43 11/23/2019 09:32 AM    ASTSGOT 23 11/23/2019 09:32 AM    ALTSGPT 22 11/23/2019 09:32 AM    TBILIRUBIN 0.5 11/23/2019 09:32 AM

## 2020-06-05 ENCOUNTER — PATIENT MESSAGE (OUTPATIENT)
Dept: MEDICAL GROUP | Facility: PHYSICIAN GROUP | Age: 60
End: 2020-06-05

## 2020-06-05 DIAGNOSIS — Z13.0 SCREENING FOR DEFICIENCY ANEMIA: ICD-10-CM

## 2020-06-05 DIAGNOSIS — I10 ESSENTIAL HYPERTENSION: ICD-10-CM

## 2020-06-05 DIAGNOSIS — E78.2 MIXED HYPERLIPIDEMIA: ICD-10-CM

## 2020-06-05 DIAGNOSIS — Z13.6 SCREENING FOR CARDIOVASCULAR CONDITION: ICD-10-CM

## 2020-06-05 DIAGNOSIS — R73.02 IMPAIRED GLUCOSE TOLERANCE: ICD-10-CM

## 2020-06-05 DIAGNOSIS — Z00.00 WELL ADULT EXAM: ICD-10-CM

## 2020-06-05 DIAGNOSIS — Z13.29 SCREENING FOR ENDOCRINE DISORDER: ICD-10-CM

## 2020-06-08 DIAGNOSIS — I10 ESSENTIAL HYPERTENSION: ICD-10-CM

## 2020-06-08 RX ORDER — LOSARTAN POTASSIUM 50 MG/1
TABLET ORAL
Qty: 90 TAB | Refills: 0 | Status: SHIPPED | OUTPATIENT
Start: 2020-06-08 | End: 2020-08-24

## 2020-06-08 NOTE — TELEPHONE ENCOUNTER
Last seen by PCP 06/28/2019. Will send 6 month(s) to the pharmacy.  Last Blood Pressure reading was 114/68 on 8/14/2019

## 2020-06-12 DIAGNOSIS — E78.2 MIXED HYPERLIPIDEMIA: ICD-10-CM

## 2020-06-16 RX ORDER — FENOFIBRATE 145 MG/1
TABLET, COATED ORAL
Qty: 90 TAB | Refills: 0 | Status: SHIPPED | OUTPATIENT
Start: 2020-06-16 | End: 2020-09-14

## 2020-06-16 NOTE — TELEPHONE ENCOUNTER
Lab Results   Component Value Date/Time    CHOLSTRLTOT 139 11/23/2019 09:32 AM    LDL 76 11/23/2019 09:32 AM    HDL 52 11/23/2019 09:32 AM    TRIGLYCERIDE 55 11/23/2019 09:32 AM       Lab Results   Component Value Date/Time    SODIUM 141 11/23/2019 09:32 AM    POTASSIUM 3.8 11/23/2019 09:32 AM    CHLORIDE 107 11/23/2019 09:32 AM    CO2 27 11/23/2019 09:32 AM    GLUCOSE 96 11/23/2019 09:32 AM    BUN 22 11/23/2019 09:32 AM    CREATININE 0.78 11/23/2019 09:32 AM    CREATININE 0.7 03/05/2008 05:17 PM     Lab Results   Component Value Date/Time    ALKPHOSPHAT 43 11/23/2019 09:32 AM    ASTSGOT 23 11/23/2019 09:32 AM    ALTSGPT 22 11/23/2019 09:32 AM    TBILIRUBIN 0.5 11/23/2019 09:32 AM      Last seen by PCP 06/28/2019. Will send 3 month(s) to the pharmacy.  Pt to make appt prior to more refills.

## 2020-06-19 DIAGNOSIS — N39.0 RECURRENT UTI: ICD-10-CM

## 2020-06-19 RX ORDER — NITROFURANTOIN 25; 75 MG/1; MG/1
CAPSULE ORAL
Qty: 30 CAP | Refills: 11 | Status: CANCELLED | OUTPATIENT
Start: 2020-06-19

## 2020-06-19 NOTE — TELEPHONE ENCOUNTER
nitrofurantoin monohyd macro (MACROBID) 100 MG Cap     Received request via: Patient    Was the patient seen in the last year in this department? Yes    Does the patient have an active prescription (recently filled or refills available) for medication(s) requested? Yes

## 2020-06-19 NOTE — TELEPHONE ENCOUNTER
----- Message from Asmita Angel sent at 6/18/2020  5:06 PM PDT -----  Regarding: Non-Urgent Medical Question  Contact: [phone]  Hi Dr. Vazquez,    1) Please prescribe Rx refill for Nitrofurantoin Monohydrate Macro 100 mg CAP NORT? Had to use it last W/E due to another UTI.  Luckily, the 1st dose took care of it right away. I will be out after the 7-day regiment.  I need back up for next time should it happen again.    2) My lower leg areas have been itchy for the last 2 days. It was bumpy at 1st then turned into little blood filled dots, possibly from over scratching.  The bumps are gone now, but the itch & red dots remain.  See photo.   I also have leg weakness & fatigue, all side effects listed for Rx (1) above. If so, what's next? New Rx w/ less side effects?  Just need a backup for emergency. Can I take Benadryl for the itch?  Dislike unnecessary Rx.    Tx much!:)  Alejandro

## 2020-06-20 ENCOUNTER — HOSPITAL ENCOUNTER (OUTPATIENT)
Dept: LAB | Facility: MEDICAL CENTER | Age: 60
End: 2020-06-20
Attending: FAMILY MEDICINE
Payer: COMMERCIAL

## 2020-06-20 DIAGNOSIS — Z13.6 SCREENING FOR CARDIOVASCULAR CONDITION: ICD-10-CM

## 2020-06-20 DIAGNOSIS — R73.02 IMPAIRED GLUCOSE TOLERANCE: ICD-10-CM

## 2020-06-20 DIAGNOSIS — Z13.29 SCREENING FOR ENDOCRINE DISORDER: ICD-10-CM

## 2020-06-20 DIAGNOSIS — I10 ESSENTIAL HYPERTENSION: ICD-10-CM

## 2020-06-20 DIAGNOSIS — E78.2 MIXED HYPERLIPIDEMIA: ICD-10-CM

## 2020-06-20 DIAGNOSIS — Z00.00 WELL ADULT EXAM: ICD-10-CM

## 2020-06-20 DIAGNOSIS — Z13.0 SCREENING FOR DEFICIENCY ANEMIA: ICD-10-CM

## 2020-06-20 LAB
25(OH)D3 SERPL-MCNC: 41 NG/ML (ref 30–100)
ALBUMIN SERPL BCP-MCNC: 4 G/DL (ref 3.2–4.9)
ALBUMIN/GLOB SERPL: 1.6 G/DL
ALP SERPL-CCNC: 42 U/L (ref 30–99)
ALT SERPL-CCNC: 24 U/L (ref 2–50)
ANION GAP SERPL CALC-SCNC: 13 MMOL/L (ref 7–16)
ANISOCYTOSIS BLD QL SMEAR: ABNORMAL
AST SERPL-CCNC: 28 U/L (ref 12–45)
BASOPHILS # BLD AUTO: 1.7 % (ref 0–1.8)
BASOPHILS # BLD: 0.07 K/UL (ref 0–0.12)
BILIRUB SERPL-MCNC: 0.2 MG/DL (ref 0.1–1.5)
BUN SERPL-MCNC: 27 MG/DL (ref 8–22)
CALCIUM SERPL-MCNC: 9.7 MG/DL (ref 8.5–10.5)
CHLORIDE SERPL-SCNC: 102 MMOL/L (ref 96–112)
CHOLEST SERPL-MCNC: 155 MG/DL (ref 100–199)
CO2 SERPL-SCNC: 26 MMOL/L (ref 20–33)
CREAT SERPL-MCNC: 0.75 MG/DL (ref 0.5–1.4)
EOSINOPHIL # BLD AUTO: 0.3 K/UL (ref 0–0.51)
EOSINOPHIL NFR BLD: 7.8 % (ref 0–6.9)
ERYTHROCYTE [DISTWIDTH] IN BLOOD BY AUTOMATED COUNT: 42.4 FL (ref 35.9–50)
EST. AVERAGE GLUCOSE BLD GHB EST-MCNC: 120 MG/DL
FASTING STATUS PATIENT QL REPORTED: NORMAL
GLOBULIN SER CALC-MCNC: 2.5 G/DL (ref 1.9–3.5)
GLUCOSE SERPL-MCNC: 95 MG/DL (ref 65–99)
HBA1C MFR BLD: 5.8 % (ref 0–5.6)
HCT VFR BLD AUTO: 36.8 % (ref 37–47)
HDLC SERPL-MCNC: 32 MG/DL
HGB BLD-MCNC: 12.3 G/DL (ref 12–16)
LDLC SERPL CALC-MCNC: 97 MG/DL
LYMPHOCYTES # BLD AUTO: 1.86 K/UL (ref 1–4.8)
LYMPHOCYTES NFR BLD: 47.8 % (ref 22–41)
MANUAL DIFF BLD: NORMAL
MCH RBC QN AUTO: 31.7 PG (ref 27–33)
MCHC RBC AUTO-ENTMCNC: 33.4 G/DL (ref 33.6–35)
MCV RBC AUTO: 94.8 FL (ref 81.4–97.8)
MICROCYTES BLD QL SMEAR: ABNORMAL
MONOCYTES # BLD AUTO: 0.34 K/UL (ref 0–0.85)
MONOCYTES NFR BLD AUTO: 8.7 % (ref 0–13.4)
MORPHOLOGY BLD-IMP: NORMAL
NEUTROPHILS # BLD AUTO: 1.32 K/UL (ref 2–7.15)
NEUTROPHILS NFR BLD: 33.9 % (ref 44–72)
NRBC # BLD AUTO: 0 K/UL
NRBC BLD-RTO: 0 /100 WBC
OVALOCYTES BLD QL SMEAR: NORMAL
PLATELET # BLD AUTO: 265 K/UL (ref 164–446)
PLATELET BLD QL SMEAR: NORMAL
PMV BLD AUTO: 9.6 FL (ref 9–12.9)
POTASSIUM SERPL-SCNC: 4.2 MMOL/L (ref 3.6–5.5)
PROT SERPL-MCNC: 6.5 G/DL (ref 6–8.2)
RBC # BLD AUTO: 3.88 M/UL (ref 4.2–5.4)
RBC BLD AUTO: PRESENT
SODIUM SERPL-SCNC: 141 MMOL/L (ref 135–145)
TRIGL SERPL-MCNC: 132 MG/DL (ref 0–149)
TSH SERPL DL<=0.005 MIU/L-ACNC: 3.06 UIU/ML (ref 0.38–5.33)
VARIANT LYMPHS BLD QL SMEAR: NORMAL
WBC # BLD AUTO: 3.9 K/UL (ref 4.8–10.8)

## 2020-06-20 PROCEDURE — 36415 COLL VENOUS BLD VENIPUNCTURE: CPT

## 2020-06-20 PROCEDURE — 83036 HEMOGLOBIN GLYCOSYLATED A1C: CPT

## 2020-06-20 PROCEDURE — 85027 COMPLETE CBC AUTOMATED: CPT

## 2020-06-20 PROCEDURE — 82306 VITAMIN D 25 HYDROXY: CPT

## 2020-06-20 PROCEDURE — 84443 ASSAY THYROID STIM HORMONE: CPT

## 2020-06-20 PROCEDURE — 80053 COMPREHEN METABOLIC PANEL: CPT

## 2020-06-20 PROCEDURE — 80061 LIPID PANEL: CPT

## 2020-06-20 PROCEDURE — 85007 BL SMEAR W/DIFF WBC COUNT: CPT

## 2020-07-31 ENCOUNTER — OFFICE VISIT (OUTPATIENT)
Dept: MEDICAL GROUP | Facility: PHYSICIAN GROUP | Age: 60
End: 2020-07-31
Payer: COMMERCIAL

## 2020-07-31 VITALS
TEMPERATURE: 98.6 F | BODY MASS INDEX: 21.36 KG/M2 | DIASTOLIC BLOOD PRESSURE: 82 MMHG | SYSTOLIC BLOOD PRESSURE: 120 MMHG | WEIGHT: 116.8 LBS | OXYGEN SATURATION: 95 % | RESPIRATION RATE: 18 BRPM | HEART RATE: 76 BPM

## 2020-07-31 DIAGNOSIS — G44.229 CHRONIC TENSION-TYPE HEADACHE, NOT INTRACTABLE: ICD-10-CM

## 2020-07-31 DIAGNOSIS — N39.0 RECURRENT UTI: ICD-10-CM

## 2020-07-31 DIAGNOSIS — R21 RASH: ICD-10-CM

## 2020-07-31 DIAGNOSIS — E87.8 ELECTROLYTE ABNORMALITY: ICD-10-CM

## 2020-07-31 PROCEDURE — 99214 OFFICE O/P EST MOD 30 MIN: CPT | Performed by: FAMILY MEDICINE

## 2020-07-31 ASSESSMENT — FIBROSIS 4 INDEX: FIB4 SCORE: 1.25

## 2020-07-31 NOTE — PROGRESS NOTES
Chief Complaint   Patient presents with   • Rash   • Headache       HISTORY OF PRESENT ILLNESS: Patient is a 60 y.o. female established patient here today for the following concerns:    1. Recurrent UTI  Here with concern over recurrent UTI.  Has finished abx and is now taking cranberry extract.  Wondering if there are any other preventive measures.     2. Rash  In addition, has hx of mast cell disease and typically will get rashes in any location in which there is friction.  She developed rash in the left groin when she wore a pair of jeans that rubbed.     3. Chronic tension-type headache, not intractable  Reports that since working from home has had more occipital headaches.  She has had headaches in this location before.  She previously underwent MRA for concern of basilar insufficiency.  She notes ice pack sometimes helps and neck stretches are helpful .  No new symptoms.     4. Electrolyte abnormality  Has hx of potassium deficiency that would cause a heaviness sensation.  It improved tremendously a few years ago when we stopped the HCTZ.  Her last check on lytes showed normal levels.  She has had a return of symptoms.  Of note she was a bit dehydrated on that lab.  Is trying to increase hydration.       Past Medical, Social, and Family history reviewed and updated in EPIC    Allergies:Ciprofloxacin    Current Outpatient Medications   Medication Sig Dispense Refill   • NON SPECIFIED D mannose - take 1 cap daily for UTI prophylaxis 90 Each 3   • nitrofurantoin (MACROBID) 100 MG Cap Take 1 tab PO after intercourse, if having symptoms of UTI take BID for 7 days 30 Cap 1   • fenofibrate (TRICOR) 145 MG Tab TAKE ONE TABLET BY MOUTH ONE TIME DAILY  90 Tab 0   • losartan (COZAAR) 50 MG Tab TAKE ONE TABLET BY MOUTH ONE TIME DAILY  90 Tab 0   • RaNITidine HCl (RANITIDINE ACID REDUCER PO) Take  by mouth.     • Blood Glucose Monitoring Suppl Supplies Misc Test strips order: Test strips for Abbott Freestyle Lite meter.  Sig: use once daily and prn ssx high or low sugar #100 RF x 3 100 Each 3   • NON SPECIFIED Massage therapy every 2-3 weeks for 1 year 24 Each 0   • acetaminophen/caffeine/butalbital 325-40-50 mg (ESGIC) -40 MG Tab Take 1 Tab by mouth every four hours as needed for Headache. 30 Tab 0   • NON SPECIFIED Massage therapy 2-4 times per week 4 Each 11   • cyclobenzaprine (FLEXERIL) 10 MG Tab Take 0.5-1 Tabs by mouth 3 times a day as needed for Muscle Spasms (neck). 30 Tab 0   • RESTASIS 0.05 % ophthalmic emulsion      • azelastine (ASTELIN) 137 MCG/SPRAY nasal spray      • bimatoprost (LUMIGAN) 0.03 % Solution Place 1 Drop in both eyes every evening.       No current facility-administered medications for this visit.          ROS:  Review of Systems   Constitutional: Negative for fever, chills, weight loss and malaise/fatigue.   HENT: Negative for ear pain, nosebleeds, congestion, sore throat and neck pain.    Eyes: Negative for blurred vision.   Respiratory: Negative for cough, sputum production, shortness of breath and wheezing.    Cardiovascular: Negative for chest pain, palpitations,  and leg swelling.   Gastrointestinal: Negative for heartburn, nausea, vomiting, diarrhea and abdominal pain.   Genitourinary: Negative for dysuria, urgency and frequency.   Musculoskeletal: Negative for myalgias, back pain and joint pain.   Skin: Negative for rash and itching.   Neurological: Negative for dizziness, tingling, tremors, sensory change, focal weakness and headaches.   Endo/Heme/Allergies: Does not bruise/bleed easily.   Psychiatric/Behavioral: Negative for depression, anxiety, suicidal ideas, insomnia and memory loss.      Exam:  /82 (BP Location: Left arm, Patient Position: Sitting, BP Cuff Size: Adult)   Pulse 76   Temp 37 °C (98.6 °F) (Temporal)   Resp 18   Wt 53 kg (116 lb 12.8 oz)   SpO2 95%     General:  Well nourished, well developed in NAD  Head is grossly normal.  Neck: Supple without JVD    Pulmonary:  Normal effort.   Cardiovascular: Regular rate  Extremities: no clubbing, cyanosis, or edema.  Psych: affect appropriate      Please note that this dictation was created using voice recognition software. I have made every reasonable attempt to correct obvious errors, but I expect that there are errors of grammar and possibly content that I did not discover before finalizing the note.    Assessment/Plan:  1. Recurrent UTI    - NON SPECIFIED; D mannose - take 1 cap daily for UTI prophylaxis  Dispense: 90 Each; Refill: 3    2. Rash  1% hydrocortisone.  If not improving RTC     3. Chronic tension-type headache, not intractable  Suspect muscle tension causing occipital neuralgia.  Ice pack stretch, avoid computer strain.     4. Electrolyte abnormality  May do a trial of OTC or old Rx of low dose potassium on occasion when noting it.  Increase hydration    Follow up if symptoms fail to improve.

## 2020-08-22 DIAGNOSIS — I10 ESSENTIAL HYPERTENSION: ICD-10-CM

## 2020-08-24 RX ORDER — LOSARTAN POTASSIUM 50 MG/1
TABLET ORAL
Qty: 90 TAB | Refills: 0 | Status: SHIPPED | OUTPATIENT
Start: 2020-08-24 | End: 2020-11-25

## 2020-09-14 DIAGNOSIS — E78.2 MIXED HYPERLIPIDEMIA: ICD-10-CM

## 2020-09-14 RX ORDER — FENOFIBRATE 145 MG/1
TABLET, COATED ORAL
Qty: 90 TAB | Refills: 0 | Status: SHIPPED | OUTPATIENT
Start: 2020-09-14 | End: 2020-12-30

## 2020-11-04 ENCOUNTER — OFFICE VISIT (OUTPATIENT)
Dept: MEDICAL GROUP | Facility: PHYSICIAN GROUP | Age: 60
End: 2020-11-04
Payer: COMMERCIAL

## 2020-11-04 VITALS
WEIGHT: 119 LBS | HEART RATE: 72 BPM | SYSTOLIC BLOOD PRESSURE: 122 MMHG | HEIGHT: 62 IN | BODY MASS INDEX: 21.9 KG/M2 | TEMPERATURE: 97.9 F | RESPIRATION RATE: 14 BRPM | OXYGEN SATURATION: 97 % | DIASTOLIC BLOOD PRESSURE: 76 MMHG

## 2020-11-04 DIAGNOSIS — E87.6 HYPOKALEMIA: ICD-10-CM

## 2020-11-04 DIAGNOSIS — R42 VERTIGO: ICD-10-CM

## 2020-11-04 DIAGNOSIS — J32.9 RHINOSINUSITIS: ICD-10-CM

## 2020-11-04 DIAGNOSIS — H92.01 ACUTE OTALGIA, RIGHT: ICD-10-CM

## 2020-11-04 PROCEDURE — 99214 OFFICE O/P EST MOD 30 MIN: CPT | Performed by: FAMILY MEDICINE

## 2020-11-04 RX ORDER — POTASSIUM CHLORIDE 750 MG/1
10 TABLET, FILM COATED, EXTENDED RELEASE ORAL DAILY
Qty: 90 TAB | Refills: 3 | Status: SHIPPED | OUTPATIENT
Start: 2020-11-04

## 2020-11-04 RX ORDER — AMOXICILLIN AND CLAVULANATE POTASSIUM 875; 125 MG/1; MG/1
1 TABLET, FILM COATED ORAL 2 TIMES DAILY
Qty: 14 TAB | Refills: 0 | Status: SHIPPED | OUTPATIENT
Start: 2020-11-04 | End: 2020-11-11

## 2020-11-04 RX ORDER — TRIAMCINOLONE ACETONIDE 40 MG/ML
40 INJECTION, SUSPENSION INTRA-ARTICULAR; INTRAMUSCULAR ONCE
Status: COMPLETED | OUTPATIENT
Start: 2020-11-04 | End: 2020-11-04

## 2020-11-04 RX ADMIN — TRIAMCINOLONE ACETONIDE 40 MG: 40 INJECTION, SUSPENSION INTRA-ARTICULAR; INTRAMUSCULAR at 09:20

## 2020-11-04 ASSESSMENT — FIBROSIS 4 INDEX: FIB4 SCORE: 1.25

## 2020-11-04 ASSESSMENT — PATIENT HEALTH QUESTIONNAIRE - PHQ9: CLINICAL INTERPRETATION OF PHQ2 SCORE: 0

## 2020-11-04 NOTE — PROGRESS NOTES
Chief Complaint   Patient presents with   • Dizziness       HISTORY OF PRESENT ILLNESS: Patient is a 60 y.o. female established patient here today for the following concerns:    1. Vertigo  2. Rhinosinusitis  3. Acute otalgia, right  Here with about 3 weeks of dizziness described as off balance.  10+ years ago she underwent evaluation with ENT for possible meniere's.  She tells me eventually they blamed it more on allergies and small eustachian tubes and was sent to allergist for allergy shots.  She reports she has been taking a break from the shots.  She reports rhinorrhea, clear in  Nature.  No headaches.  Dizziness is worse with any head movement, particularly bending forward.  She has also been experiencing right ear pain and pressure.  No otorrhea.  Hearing is only slightly reduced on that side and no tinnitus today.     4. Hypokalemia  Requests refill on potassium as this has helped a lot with muscle cramping.        Past Medical, Social, and Family history reviewed and updated in EPIC    Allergies:Ciprofloxacin    Current Outpatient Medications   Medication Sig Dispense Refill   • amoxicillin-clavulanate (AUGMENTIN) 875-125 MG Tab Take 1 Tab by mouth 2 times a day for 7 days. 14 Tab 0   • potassium chloride ER (KLOR-CON) 10 MEQ tablet Take 1 Tab by mouth every day. 90 Tab 3   • fenofibrate (TRICOR) 145 MG Tab TAKE ONE TABLET BY MOUTH ONE TIME DAILY  90 Tab 0   • losartan (COZAAR) 50 MG Tab TAKE ONE TABLET BY MOUTH ONE TIME DAILY  90 Tab 0   • NON SPECIFIED D mannose - take 1 cap daily for UTI prophylaxis 90 Each 3   • nitrofurantoin (MACROBID) 100 MG Cap Take 1 tab PO after intercourse, if having symptoms of UTI take BID for 7 days 30 Cap 1   • Blood Glucose Monitoring Suppl Supplies Misc Test strips order: Test strips for Abbott Freestyle Lite meter. Sig: use once daily and prn ssx high or low sugar #100 RF x 3 100 Each 3   • NON SPECIFIED Massage therapy every 2-3 weeks for 1 year 24 Each 0   •  "acetaminophen/caffeine/butalbital 325-40-50 mg (ESGIC) -40 MG Tab Take 1 Tab by mouth every four hours as needed for Headache. 30 Tab 0   • NON SPECIFIED Massage therapy 2-4 times per week 4 Each 11   • cyclobenzaprine (FLEXERIL) 10 MG Tab Take 0.5-1 Tabs by mouth 3 times a day as needed for Muscle Spasms (neck). 30 Tab 0   • RESTASIS 0.05 % ophthalmic emulsion      • azelastine (ASTELIN) 137 MCG/SPRAY nasal spray      • bimatoprost (LUMIGAN) 0.03 % Solution Place 1 Drop in both eyes every evening.     • RaNITidine HCl (RANITIDINE ACID REDUCER PO) Take  by mouth.       Current Facility-Administered Medications   Medication Dose Route Frequency Provider Last Rate Last Admin   • triamcinolone acetonide (KENALOG-40) injection 40 mg  40 mg Intramuscular Once Maureen Vazquez M.D.             ROS:  Review of Systems   Constitutional: Negative for fever, chills, weight loss and malaise/fatigue.   HENT: Negative for ear pain, nosebleeds, congestion, sore throat and neck pain.    Eyes: Negative for blurred vision.   Respiratory: Negative for cough, sputum production, shortness of breath and wheezing.    Cardiovascular: Negative for chest pain, palpitations,  and leg swelling.   Gastrointestinal: Negative for heartburn, nausea, vomiting, diarrhea and abdominal pain.   Genitourinary: Negative for dysuria, urgency and frequency.   Musculoskeletal: Negative for myalgias, back pain and joint pain.   Skin: Negative for rash and itching.   Neurological: Negative for dizziness, tingling, tremors, sensory change, focal weakness and headaches.   Endo/Heme/Allergies: Does not bruise/bleed easily.   Psychiatric/Behavioral: Negative for depression, anxiety, suicidal ideas, insomnia and memory loss.      Exam:  /76   Pulse 72   Temp 36.6 °C (97.9 °F)   Resp 14   Ht 1.575 m (5' 2\")   Wt 54 kg (119 lb)   SpO2 97%     General:  Well nourished, well developed in NAD  Head is grossly normal.  HEENT: Normocephalic and " atraumatic. TMserythematous on the right, left clear. Oropharynx is clear without erythema and no tonsillar exudate. Nasal mucosa erythermatous with minimal clear rhinorrhea.  Neck: Supple without JVD   Pulmonary:  Normal effort.   Cardiovascular: Regular rate  Extremities: no clubbing, cyanosis, or edema.  Psych: affect appropriate      Please note that this dictation was created using voice recognition software. I have made every reasonable attempt to correct obvious errors, but I expect that there are errors of grammar and possibly content that I did not discover before finalizing the note.    Assessment/Plan:  1. Vertigo  2. Rhinosinusitis  Likely due to eustachian tube dysfunction  - triamcinolone acetonide (KENALOG-40) injection 40 mg  - amoxicillin-clavulanate (AUGMENTIN) 875-125 MG Tab; Take 1 Tab by mouth 2 times a day for 7 days.  Dispense: 14 Tab; Refill: 0    3. Acute otalgia, right  - amoxicillin-clavulanate (AUGMENTIN) 875-125 MG Tab; Take 1 Tab by mouth 2 times a day for 7 days.  Dispense: 14 Tab; Refill: 0    4. Hypokalemia  - potassium chloride ER (KLOR-CON) 10 MEQ tablet; Take 1 Tab by mouth every day.  Dispense: 90 Tab; Refill: 3

## 2020-11-06 ENCOUNTER — HOSPITAL ENCOUNTER (OUTPATIENT)
Dept: RADIOLOGY | Facility: MEDICAL CENTER | Age: 60
End: 2020-11-06
Attending: OBSTETRICS & GYNECOLOGY
Payer: COMMERCIAL

## 2020-11-06 DIAGNOSIS — Z12.31 VISIT FOR SCREENING MAMMOGRAM: ICD-10-CM

## 2020-11-06 PROCEDURE — 77067 SCR MAMMO BI INCL CAD: CPT

## 2020-11-22 DIAGNOSIS — I10 ESSENTIAL HYPERTENSION: ICD-10-CM

## 2020-11-25 RX ORDER — LOSARTAN POTASSIUM 50 MG/1
TABLET ORAL
Qty: 90 TAB | Refills: 0 | Status: SHIPPED | OUTPATIENT
Start: 2020-11-25 | End: 2021-02-04 | Stop reason: SDUPTHER

## 2020-12-07 ENCOUNTER — APPOINTMENT (RX ONLY)
Dept: URBAN - METROPOLITAN AREA CLINIC 4 | Facility: CLINIC | Age: 60
Setting detail: DERMATOLOGY
End: 2020-12-07

## 2020-12-07 DIAGNOSIS — L81.4 OTHER MELANIN HYPERPIGMENTATION: ICD-10-CM

## 2020-12-07 DIAGNOSIS — D22 MELANOCYTIC NEVI: ICD-10-CM

## 2020-12-07 DIAGNOSIS — L50.8 OTHER URTICARIA: ICD-10-CM

## 2020-12-07 DIAGNOSIS — L82.1 OTHER SEBORRHEIC KERATOSIS: ICD-10-CM

## 2020-12-07 DIAGNOSIS — D18.0 HEMANGIOMA: ICD-10-CM

## 2020-12-07 PROBLEM — D22.5 MELANOCYTIC NEVI OF TRUNK: Status: ACTIVE | Noted: 2020-12-07

## 2020-12-07 PROBLEM — D18.01 HEMANGIOMA OF SKIN AND SUBCUTANEOUS TISSUE: Status: ACTIVE | Noted: 2020-12-07

## 2020-12-07 PROCEDURE — 99203 OFFICE O/P NEW LOW 30 MIN: CPT

## 2020-12-07 PROCEDURE — ? COUNSELING

## 2020-12-07 PROCEDURE — ? ADDITIONAL NOTES

## 2020-12-07 ASSESSMENT — LOCATION DETAILED DESCRIPTION DERM
LOCATION DETAILED: RIGHT LATERAL UPPER BACK
LOCATION DETAILED: RIGHT SUPERIOR LATERAL UPPER BACK
LOCATION DETAILED: RIGHT INFERIOR UPPER BACK

## 2020-12-07 ASSESSMENT — LOCATION SIMPLE DESCRIPTION DERM
LOCATION SIMPLE: RIGHT BACK
LOCATION SIMPLE: RIGHT UPPER BACK

## 2020-12-07 ASSESSMENT — LOCATION ZONE DERM: LOCATION ZONE: TRUNK

## 2020-12-07 NOTE — PROCEDURE: ADDITIONAL NOTES
Additional Notes: Discussed treatment options for chronic urticaria including: oral antihistamine, topical steroids and referral to an allergist for further diagnosis and treatment. \\nBenefits and side effects of antihistamines discussed at length with patient. May take otc antihistamine ps as needed.\\nPatient prefers to not use antihistamine because of her dry skin. Patient declines Rx for antihistamine or Triamcinolone cream.\\nDiscussed of using gentle cleansers and moisturizing creams like Cetaphil/CeraVe for daily use.\\nAvoid fragranced products and detergents, fragrance free protocol discussed with patient.
Detail Level: Zone

## 2020-12-27 DIAGNOSIS — E78.2 MIXED HYPERLIPIDEMIA: ICD-10-CM

## 2020-12-30 DIAGNOSIS — E78.2 MIXED HYPERLIPIDEMIA: ICD-10-CM

## 2020-12-30 RX ORDER — FENOFIBRATE 145 MG/1
TABLET, COATED ORAL
Qty: 90 TAB | Refills: 1 | Status: SHIPPED | OUTPATIENT
Start: 2020-12-30 | End: 2021-03-29 | Stop reason: SDUPTHER

## 2020-12-31 NOTE — TELEPHONE ENCOUNTER
Pt has had OV within the 12 month protocol and lipid panel is current. 6 month supply sent to pharmacy.   Lab Results   Component Value Date/Time    CHOLSTRLTOT 155 06/20/2020 09:35 AM    LDL 97 06/20/2020 09:35 AM    HDL 32 (A) 06/20/2020 09:35 AM    TRIGLYCERIDE 132 06/20/2020 09:35 AM       Lab Results   Component Value Date/Time    SODIUM 141 06/20/2020 09:35 AM    POTASSIUM 4.2 06/20/2020 09:35 AM    CHLORIDE 102 06/20/2020 09:35 AM    CO2 26 06/20/2020 09:35 AM    GLUCOSE 95 06/20/2020 09:35 AM    BUN 27 (H) 06/20/2020 09:35 AM    CREATININE 0.75 06/20/2020 09:35 AM    CREATININE 0.7 03/05/2008 05:17 PM     Lab Results   Component Value Date/Time    ALKPHOSPHAT 42 06/20/2020 09:35 AM    ASTSGOT 28 06/20/2020 09:35 AM    ALTSGPT 24 06/20/2020 09:35 AM    TBILIRUBIN 0.2 06/20/2020 09:35 AM

## 2021-01-04 RX ORDER — FENOFIBRATE 145 MG/1
TABLET, COATED ORAL
Qty: 90 TAB | Refills: 0 | OUTPATIENT
Start: 2021-01-04

## 2021-02-04 ENCOUNTER — TELEMEDICINE (OUTPATIENT)
Dept: MEDICAL GROUP | Facility: PHYSICIAN GROUP | Age: 61
End: 2021-02-04
Payer: COMMERCIAL

## 2021-02-04 VITALS — BODY MASS INDEX: 21.16 KG/M2 | HEIGHT: 62 IN | WEIGHT: 115 LBS

## 2021-02-04 DIAGNOSIS — Z13.29 SCREENING FOR ENDOCRINE DISORDER: ICD-10-CM

## 2021-02-04 DIAGNOSIS — Z13.0 SCREENING FOR DEFICIENCY ANEMIA: ICD-10-CM

## 2021-02-04 DIAGNOSIS — Z13.6 SCREENING FOR CARDIOVASCULAR CONDITION: ICD-10-CM

## 2021-02-04 DIAGNOSIS — I10 ESSENTIAL HYPERTENSION: ICD-10-CM

## 2021-02-04 DIAGNOSIS — E78.2 MIXED HYPERLIPIDEMIA: Chronic | ICD-10-CM

## 2021-02-04 DIAGNOSIS — Z11.59 NEED FOR HEPATITIS C SCREENING TEST: ICD-10-CM

## 2021-02-04 DIAGNOSIS — R73.02 IMPAIRED GLUCOSE TOLERANCE: Chronic | ICD-10-CM

## 2021-02-04 PROCEDURE — 99204 OFFICE O/P NEW MOD 45 MIN: CPT | Performed by: INTERNAL MEDICINE

## 2021-02-04 RX ORDER — LOSARTAN POTASSIUM 50 MG/1
TABLET ORAL
Qty: 90 TAB | Refills: 3 | Status: SHIPPED | OUTPATIENT
Start: 2021-02-04

## 2021-02-04 ASSESSMENT — PATIENT HEALTH QUESTIONNAIRE - PHQ9: CLINICAL INTERPRETATION OF PHQ2 SCORE: 0

## 2021-02-04 ASSESSMENT — FIBROSIS 4 INDEX: FIB4 SCORE: 1.25

## 2021-02-04 NOTE — ASSESSMENT & PLAN NOTE
This is a chronic and stable condition noted since the last couple years.  Patient is currently exercise and on diet therapy.  Lab tests ordered for follow-up.

## 2021-02-04 NOTE — PROGRESS NOTES
This visit was conducted via  Zoom Video Virtual Visit using secure and encrypted videoconferencing technology.   The patient's identity was confirmed and verbal consent was obtained for this virtual visit.  -------------------------------------------------------------------------------    CC: Meet new provider  Medication refills         HPI: This is a 60 y.o. pt.  Pt's medical history is notable for:      Essential hypertension  This is a chronic condition for the patient is presently taking losartan.  No significant side effects reported.  Patient is requesting refills    Impaired glucose tolerance  This is a chronic and stable condition noted since the last couple years.  Patient is currently exercise and on diet therapy.  Lab tests ordered for follow-up.    Mixed hyperlipidemia  Chronic condition patient is maintaining on fenofibrate.  Lab tests ordered for follow-up.              REVIEW OF SYSTEMS:     Constitutional:  no fever / chills   Neurologic: no headaches, no numbness/tingling  ENT: no sore throat, no hearing loss  CV:  no chest pain, no palpitations  Pulmonary: no SOB, no cough          Allergies: Ciprofloxacin    Current Outpatient Medications Ordered in Epic   Medication Sig Dispense Refill   • NON SPECIFIED Massage therapy every 2-3 weeks for 1 year 24 Each 0   • losartan (COZAAR) 50 MG Tab TAKE ONE TABLET BY MOUTH ONE TIME DAILY 90 Tab 3   • fenofibrate (TRICOR) 145 MG Tab TAKE ONE TABLET BY MOUTH ONE TIME DAILY  90 Tab 1   • potassium chloride ER (KLOR-CON) 10 MEQ tablet Take 1 Tab by mouth every day. 90 Tab 3   • NON SPECIFIED D mannose - take 1 cap daily for UTI prophylaxis 90 Each 3   • nitrofurantoin (MACROBID) 100 MG Cap Take 1 tab PO after intercourse, if having symptoms of UTI take BID for 7 days 30 Cap 1   • Blood Glucose Monitoring Suppl Supplies Misc Test strips order: Test strips for Abbott Freestyle Lite meter. Sig: use once daily and prn ssx high or low sugar #100 RF x 3 100 Each 3    • acetaminophen/caffeine/butalbital 325-40-50 mg (ESGIC) -40 MG Tab Take 1 Tab by mouth every four hours as needed for Headache. 30 Tab 0   • NON SPECIFIED Massage therapy 2-4 times per week 4 Each 11   • cyclobenzaprine (FLEXERIL) 10 MG Tab Take 0.5-1 Tabs by mouth 3 times a day as needed for Muscle Spasms (neck). 30 Tab 0   • RESTASIS 0.05 % ophthalmic emulsion      • azelastine (ASTELIN) 137 MCG/SPRAY nasal spray      • bimatoprost (LUMIGAN) 0.03 % Solution Place 1 Drop in both eyes every evening.       No current Epic-ordered facility-administered medications on file.        Past Medical History:   Diagnosis Date   • Allergic rhinitis due to pollen    • Allergy    • Cervicalgia    • Essential hypertension, benign    • Glaucoma    • Mixed hyperlipidemia         Past Surgical History:   Procedure Laterality Date   • CHOLECYSTECTOMY  2007   • HYSTERECTOMY, TOTAL ABDOMINAL  2004    endometriosis   • ABDOMINAL HYSTERECTOMY TOTAL      endometriosis   • SINUSOTOMIES  2001,2006        Family History   Problem Relation Age of Onset   • Hypertension Father    • Hyperlipidemia Father    • Diabetes Sister    • Diabetes Brother         Social History     Tobacco Use   Smoking Status Never Smoker   Smokeless Tobacco Never Used          Social History     Substance and Sexual Activity   Alcohol Use No   • Alcohol/week: 0.0 oz        ------------------------------------------------------------------      PHYSICAL EXAM:   Psych:  A&O x 3, mood and affect appropriate  Constitutional: no distress  Skin: No apparent rashes  Eye: Conjunctiva clear, no icterus  ENMT: Lips without lesions. Phonation normal.  Neck: No obvious masses visible, no thyromegaly.   Respiratory: Unlabored respiratory effort      ---------------------------------------------------------------------    ASSESSMENT:   1. Essential hypertension  losartan (COZAAR) 50 MG Tab   2. Mixed hyperlipidemia  ALANINE AMINO-TRANS    Lipid Profile   3. Impaired  glucose tolerance     4. Screening for endocrine disorder  HEMOGLOBIN A1C    TSH    MICROALBUMIN CREAT RATIO URINE    VITAMIN D,25 HYDROXY   5. Need for hepatitis C screening test  HCV Scrn ( [id number] [employer])   6. Screening for cardiovascular condition  Basic Metabolic Panel   7. Screening for deficiency anemia  CBC WITH DIFFERENTIAL          MEDICAL DECISION MAKING: DISCUSSION / STATUS / PLAN:    Hypertension.  Stable.  Continue with losartan.  Refill ordered.    Hyperlipidemia.  Lab tests ordered.  Recommend low-fat low-cholesterol diet.    Prediabetes.  Advised the patient regarding diet and lifestyle changes.  Lab tests ordered for follow-up.              Return in about 6 months (around 2021).       PATIENT EDUCATION:  -If any problems should arise, patient was advised to contact our office or go to ER to be evaluated.      Please note that this dictation was created using voice recognition software. I have made every reasonable attempt to correct obvious errors, but it is possible there are errors of grammar and possibly content that I did not discover before finalizing the note.

## 2021-02-04 NOTE — ASSESSMENT & PLAN NOTE
This is a chronic condition for the patient is presently taking losartan.  No significant side effects reported.  Patient is requesting refills

## 2021-02-16 NOTE — PROGRESS NOTES
Pt requested for her massage therapy Rx to be mailed to her house instead of having her  in the office.    Rx placed up in front office for mail delivery.

## 2021-02-23 ENCOUNTER — PATIENT MESSAGE (OUTPATIENT)
Dept: MEDICAL GROUP | Facility: PHYSICIAN GROUP | Age: 61
End: 2021-02-23

## 2021-02-24 ENCOUNTER — PATIENT MESSAGE (OUTPATIENT)
Dept: MEDICAL GROUP | Facility: PHYSICIAN GROUP | Age: 61
End: 2021-02-24

## 2021-02-24 DIAGNOSIS — N39.0 RECURRENT UTI: ICD-10-CM

## 2021-02-24 NOTE — TELEPHONE ENCOUNTER
----- Message from Asmita Angel sent at 2/23/2021  7:58 PM PST -----  Regarding: Prescription Question  Contact: [phone]  Hi Dr. Lama,  I need a refill for Nitrofurantoin. Can you please send the prescription to [facility] in [place]?    Thank you so much and have a good day!    Alejandro

## 2021-02-24 NOTE — TELEPHONE ENCOUNTER
From: Asmita Angel  To: Physician Chris Lama  Sent: 2/23/2021 7:58 PM PST  Subject: Prescription Question    Hi Dr. Lama,  I need a refill for Nitrofurantoin. Can you please send the prescription to [facility] in [place]?    Thank you so much and have a good day!    Alejandro

## 2021-02-24 NOTE — TELEPHONE ENCOUNTER
From: Asmita Angel  To: Medical Assistant Laury GARCIA  Sent: 2/24/2021 7:41 AM PST  Subject: Prescription Question    Thank you, Laury! Have a great day!    Alejandro      ----- Message -----   From:Medical Assistant Laury GARCIA   Sent:2/24/2021 7:23 AM PST   To:Asmita Angel   Subject:RE: Prescription Question    Good Morning Yen,    Your Rx refill request for nitrofurantoin (MACROBID) 100 MG Cap has been sent over to our pharmacy team for approval.    Thanks,  Laury GOLDSTEIN      ----- Message -----   From:Asmita Angel   Sent:2/23/2021 7:58 PM PST   To:Physician Chris Lama   Subject:Prescription Question    Hi Dr. Lama,  I need a refill for Nitrofurantoin. Can you please send the prescription to [facility] in [place]?    Thank you so much and have a good day!    Alejandro

## 2021-02-25 RX ORDER — NITROFURANTOIN 25; 75 MG/1; MG/1
CAPSULE ORAL
Qty: 30 CAPSULE | Refills: 0 | Status: SHIPPED | OUTPATIENT
Start: 2021-02-25

## 2021-02-25 NOTE — TELEPHONE ENCOUNTER
From: Asmita Angel  To: Medical Assistant Laury GARCIA  Sent: 2/24/2021 5:26 PM PST  Subject: Prescription Question    John Schaeffer, do you know when the pharmacy team is going to approve that? I just checked with [facility], and they haven't received the order yet. I'm leaving [facility] on Friday for a week, and I want to make sure I have the Rx in case my I need to take them. Thank you so much!    Alejandro      ----- Message -----   From:Medical Assistant Laury GARCIA   Sent:2/24/2021 8:21 AM PST   To:Asmita Angel   Subject:RE: Prescription Question    You as well! :)      ----- Message -----   From:Asmita Angel   Sent:2/24/2021 7:41 AM PST   To:Medical Assistant Laury GARCIA   Subject:Prescription Question    Slick you, Laury! Have a great day!    Alejandro      ----- Message -----   From:Medical Assistant Laury GARCIA   Sent:2/24/2021 7:23 AM PST   To:Asmita Angel   Subject:RE: Prescription Question    Good Morning Yen,    Your Rx refill request for nitrofurantoin (MACROBID) 100 MG Cap has been sent over to our pharmacy team for approval.    Thanks,  Laury GOLDSTEIN      ----- Message -----   From:Asmita Angel   Sent:2/23/2021 7:58 PM PST   To:Physician Chris Lama   Subject:Prescription Question    John Lama,  I need a refill for Nitrofurantoin. Can you please send the prescription to [facility] in [place]?    Thank you so much and have a good day!    Alejandro

## 2021-03-28 ENCOUNTER — PATIENT MESSAGE (OUTPATIENT)
Dept: MEDICAL GROUP | Facility: PHYSICIAN GROUP | Age: 61
End: 2021-03-28

## 2021-03-28 DIAGNOSIS — E78.2 MIXED HYPERLIPIDEMIA: ICD-10-CM

## 2021-03-29 NOTE — TELEPHONE ENCOUNTER
From: Asmita Angel  To: Physician Chris Lama  Sent: 3/28/2021 3:38 PM PDT  Subject: Prescription Question    Hi Dr. Lama and Laury,  It's time for my Fenofibrate refill. Can you please send the Rx to [facility] (mail order)? This is my first Fenofibrate with them, and nd I have enough to last me a week.     Thank you and have a great day!    Alejandro

## 2021-03-30 RX ORDER — FENOFIBRATE 145 MG/1
TABLET, COATED ORAL
Qty: 90 TABLET | Refills: 1 | Status: SHIPPED | OUTPATIENT
Start: 2021-03-30

## 2021-05-07 VITALS — DIASTOLIC BLOOD PRESSURE: 85 MMHG | SYSTOLIC BLOOD PRESSURE: 137 MMHG

## 2021-05-29 ENCOUNTER — HOSPITAL ENCOUNTER (OUTPATIENT)
Dept: LAB | Facility: MEDICAL CENTER | Age: 61
End: 2021-05-29
Attending: FAMILY MEDICINE
Payer: COMMERCIAL

## 2021-05-29 LAB
ALBUMIN SERPL BCP-MCNC: 4.5 G/DL (ref 3.2–4.9)
ALBUMIN/GLOB SERPL: 1.7 G/DL
ALP SERPL-CCNC: 49 U/L (ref 30–99)
ALT SERPL-CCNC: 27 U/L (ref 2–50)
ANION GAP SERPL CALC-SCNC: 13 MMOL/L (ref 7–16)
AST SERPL-CCNC: 29 U/L (ref 12–45)
BASOPHILS # BLD AUTO: 1 % (ref 0–1.8)
BASOPHILS # BLD: 0.05 K/UL (ref 0–0.12)
BILIRUB SERPL-MCNC: 0.4 MG/DL (ref 0.1–1.5)
BUN SERPL-MCNC: 32 MG/DL (ref 8–22)
CALCIUM SERPL-MCNC: 10 MG/DL (ref 8.5–10.5)
CHLORIDE SERPL-SCNC: 104 MMOL/L (ref 96–112)
CHOLEST SERPL-MCNC: 175 MG/DL (ref 100–199)
CO2 SERPL-SCNC: 24 MMOL/L (ref 20–33)
CREAT SERPL-MCNC: 0.88 MG/DL (ref 0.5–1.4)
CREAT UR-MCNC: 34.79 MG/DL
EOSINOPHIL # BLD AUTO: 0.12 K/UL (ref 0–0.51)
EOSINOPHIL NFR BLD: 2.4 % (ref 0–6.9)
ERYTHROCYTE [DISTWIDTH] IN BLOOD BY AUTOMATED COUNT: 45.1 FL (ref 35.9–50)
EST. AVERAGE GLUCOSE BLD GHB EST-MCNC: 126 MG/DL
FASTING STATUS PATIENT QL REPORTED: NORMAL
GLOBULIN SER CALC-MCNC: 2.7 G/DL (ref 1.9–3.5)
GLUCOSE SERPL-MCNC: 101 MG/DL (ref 65–99)
HBA1C MFR BLD: 6 % (ref 4–5.6)
HCT VFR BLD AUTO: 41.4 % (ref 37–47)
HDLC SERPL-MCNC: 50 MG/DL
HGB BLD-MCNC: 13.2 G/DL (ref 12–16)
IMM GRANULOCYTES # BLD AUTO: 0.01 K/UL (ref 0–0.11)
IMM GRANULOCYTES NFR BLD AUTO: 0.2 % (ref 0–0.9)
LDLC SERPL CALC-MCNC: 106 MG/DL
LYMPHOCYTES # BLD AUTO: 2.26 K/UL (ref 1–4.8)
LYMPHOCYTES NFR BLD: 46.1 % (ref 22–41)
MCH RBC QN AUTO: 30.3 PG (ref 27–33)
MCHC RBC AUTO-ENTMCNC: 31.9 G/DL (ref 33.6–35)
MCV RBC AUTO: 95 FL (ref 81.4–97.8)
MICROALBUMIN UR-MCNC: <1.2 MG/DL
MICROALBUMIN/CREAT UR: NORMAL MG/G (ref 0–30)
MONOCYTES # BLD AUTO: 0.41 K/UL (ref 0–0.85)
MONOCYTES NFR BLD AUTO: 8.4 % (ref 0–13.4)
NEUTROPHILS # BLD AUTO: 2.05 K/UL (ref 2–7.15)
NEUTROPHILS NFR BLD: 41.9 % (ref 44–72)
NRBC # BLD AUTO: 0 K/UL
NRBC BLD-RTO: 0 /100 WBC
PLATELET # BLD AUTO: 297 K/UL (ref 164–446)
PMV BLD AUTO: 10.2 FL (ref 9–12.9)
POTASSIUM SERPL-SCNC: 4.3 MMOL/L (ref 3.6–5.5)
PROT SERPL-MCNC: 7.2 G/DL (ref 6–8.2)
RBC # BLD AUTO: 4.36 M/UL (ref 4.2–5.4)
SODIUM SERPL-SCNC: 141 MMOL/L (ref 135–145)
T3 SERPL-MCNC: 116 NG/DL (ref 60–181)
T4 FREE SERPL-MCNC: 1.26 NG/DL (ref 0.93–1.7)
TRIGL SERPL-MCNC: 93 MG/DL (ref 0–149)
TSH SERPL DL<=0.005 MIU/L-ACNC: 2.62 UIU/ML (ref 0.38–5.33)
VIT B12 SERPL-MCNC: 3019 PG/ML (ref 211–911)
WBC # BLD AUTO: 4.9 K/UL (ref 4.8–10.8)

## 2021-05-29 PROCEDURE — 82043 UR ALBUMIN QUANTITATIVE: CPT

## 2021-05-29 PROCEDURE — 36415 COLL VENOUS BLD VENIPUNCTURE: CPT

## 2021-05-29 PROCEDURE — 82607 VITAMIN B-12: CPT

## 2021-05-29 PROCEDURE — 84439 ASSAY OF FREE THYROXINE: CPT

## 2021-05-29 PROCEDURE — 84480 ASSAY TRIIODOTHYRONINE (T3): CPT

## 2021-05-29 PROCEDURE — 83036 HEMOGLOBIN GLYCOSYLATED A1C: CPT

## 2021-05-29 PROCEDURE — 84443 ASSAY THYROID STIM HORMONE: CPT

## 2021-05-29 PROCEDURE — 80053 COMPREHEN METABOLIC PANEL: CPT

## 2021-05-29 PROCEDURE — 85025 COMPLETE CBC W/AUTO DIFF WBC: CPT

## 2021-05-29 PROCEDURE — 82570 ASSAY OF URINE CREATININE: CPT

## 2021-05-29 PROCEDURE — 80061 LIPID PANEL: CPT

## 2021-11-15 ENCOUNTER — HOSPITAL ENCOUNTER (OUTPATIENT)
Dept: RADIOLOGY | Facility: MEDICAL CENTER | Age: 61
End: 2021-11-15
Attending: FAMILY MEDICINE
Payer: COMMERCIAL

## 2021-11-15 DIAGNOSIS — Z12.31 VISIT FOR SCREENING MAMMOGRAM: ICD-10-CM

## 2021-11-15 PROCEDURE — 77063 BREAST TOMOSYNTHESIS BI: CPT

## 2021-12-03 ENCOUNTER — HOSPITAL ENCOUNTER (OUTPATIENT)
Dept: LAB | Facility: MEDICAL CENTER | Age: 61
End: 2021-12-03
Attending: FAMILY MEDICINE
Payer: COMMERCIAL

## 2021-12-03 LAB
ALBUMIN SERPL BCP-MCNC: 5 G/DL (ref 3.2–4.9)
ALBUMIN/GLOB SERPL: 2.5 G/DL
ALP SERPL-CCNC: 50 U/L (ref 30–99)
ALT SERPL-CCNC: 17 U/L (ref 2–50)
ANION GAP SERPL CALC-SCNC: 12 MMOL/L (ref 7–16)
APPEARANCE UR: CLEAR
AST SERPL-CCNC: 24 U/L (ref 12–45)
BASOPHILS # BLD AUTO: 1 % (ref 0–1.8)
BASOPHILS # BLD: 0.04 K/UL (ref 0–0.12)
BILIRUB SERPL-MCNC: 0.6 MG/DL (ref 0.1–1.5)
BILIRUB UR QL STRIP.AUTO: NEGATIVE
BUN SERPL-MCNC: 32 MG/DL (ref 8–22)
CALCIUM SERPL-MCNC: 9.9 MG/DL (ref 8.5–10.5)
CHLORIDE SERPL-SCNC: 104 MMOL/L (ref 96–112)
CO2 SERPL-SCNC: 24 MMOL/L (ref 20–33)
COLOR UR: YELLOW
CREAT SERPL-MCNC: 0.77 MG/DL (ref 0.5–1.4)
EOSINOPHIL # BLD AUTO: 0.1 K/UL (ref 0–0.51)
EOSINOPHIL NFR BLD: 2.6 % (ref 0–6.9)
ERYTHROCYTE [DISTWIDTH] IN BLOOD BY AUTOMATED COUNT: 43.5 FL (ref 35.9–50)
GLOBULIN SER CALC-MCNC: 2 G/DL (ref 1.9–3.5)
GLUCOSE SERPL-MCNC: 91 MG/DL (ref 65–99)
GLUCOSE UR STRIP.AUTO-MCNC: NEGATIVE MG/DL
HCT VFR BLD AUTO: 41.3 % (ref 37–47)
HGB BLD-MCNC: 13.6 G/DL (ref 12–16)
IMM GRANULOCYTES # BLD AUTO: 0.01 K/UL (ref 0–0.11)
IMM GRANULOCYTES NFR BLD AUTO: 0.3 % (ref 0–0.9)
KETONES UR STRIP.AUTO-MCNC: NEGATIVE MG/DL
LEUKOCYTE ESTERASE UR QL STRIP.AUTO: NEGATIVE
LYMPHOCYTES # BLD AUTO: 1.9 K/UL (ref 1–4.8)
LYMPHOCYTES NFR BLD: 48.6 % (ref 22–41)
MCH RBC QN AUTO: 30.7 PG (ref 27–33)
MCHC RBC AUTO-ENTMCNC: 32.9 G/DL (ref 33.6–35)
MCV RBC AUTO: 93.2 FL (ref 81.4–97.8)
MICRO URNS: NORMAL
MONOCYTES # BLD AUTO: 0.23 K/UL (ref 0–0.85)
MONOCYTES NFR BLD AUTO: 5.9 % (ref 0–13.4)
NEUTROPHILS # BLD AUTO: 1.63 K/UL (ref 2–7.15)
NEUTROPHILS NFR BLD: 41.6 % (ref 44–72)
NITRITE UR QL STRIP.AUTO: NEGATIVE
NRBC # BLD AUTO: 0 K/UL
NRBC BLD-RTO: 0 /100 WBC
PH UR STRIP.AUTO: 7 [PH] (ref 5–8)
PLATELET # BLD AUTO: 295 K/UL (ref 164–446)
PMV BLD AUTO: 9.9 FL (ref 9–12.9)
POTASSIUM SERPL-SCNC: 3.7 MMOL/L (ref 3.6–5.5)
PROT SERPL-MCNC: 7 G/DL (ref 6–8.2)
PROT UR QL STRIP: NEGATIVE MG/DL
RBC # BLD AUTO: 4.43 M/UL (ref 4.2–5.4)
RBC UR QL AUTO: NEGATIVE
SODIUM SERPL-SCNC: 140 MMOL/L (ref 135–145)
SP GR UR STRIP.AUTO: 1.02
TSH SERPL DL<=0.005 MIU/L-ACNC: 3.44 UIU/ML (ref 0.38–5.33)
UROBILINOGEN UR STRIP.AUTO-MCNC: 0.2 MG/DL
WBC # BLD AUTO: 3.9 K/UL (ref 4.8–10.8)

## 2021-12-03 PROCEDURE — 36415 COLL VENOUS BLD VENIPUNCTURE: CPT

## 2021-12-03 PROCEDURE — 84443 ASSAY THYROID STIM HORMONE: CPT

## 2021-12-03 PROCEDURE — 81003 URINALYSIS AUTO W/O SCOPE: CPT

## 2021-12-03 PROCEDURE — 85025 COMPLETE CBC W/AUTO DIFF WBC: CPT

## 2021-12-03 PROCEDURE — 80053 COMPREHEN METABOLIC PANEL: CPT

## 2021-12-13 ENCOUNTER — HOSPITAL ENCOUNTER (OUTPATIENT)
Dept: LAB | Facility: MEDICAL CENTER | Age: 61
End: 2021-12-13
Attending: FAMILY MEDICINE
Payer: COMMERCIAL

## 2021-12-13 LAB — VIT B12 SERPL-MCNC: 2254 PG/ML (ref 211–911)

## 2021-12-13 PROCEDURE — 82607 VITAMIN B-12: CPT

## 2021-12-13 PROCEDURE — 36415 COLL VENOUS BLD VENIPUNCTURE: CPT

## 2022-03-31 ENCOUNTER — TELEPHONE (OUTPATIENT)
Dept: HEALTH INFORMATION MANAGEMENT | Facility: OTHER | Age: 62
End: 2022-03-31
Payer: COMMERCIAL

## 2022-03-31 DIAGNOSIS — M54.30 SCIATICA, UNSPECIFIED LATERALITY: ICD-10-CM

## 2022-03-31 NOTE — TELEPHONE ENCOUNTER
1. Caller Name: Asmita HorowitzAlinaSid                          Call Back Number: ,ph      How would the patient prefer to be contacted with a response: AVIAhart message    2. SPECIFIC Action To Be Taken: Referral pending, please sign.    3. Diagnosis/Clinical Reason for Request: Sciatica    4. Specialty & Provider Name/Lab/Imaging Location: Physical Therapy    5. Is appointment scheduled for requested order/referral. NO      My Chart Message:  Asmita Allen Emilynes would like to request a referral.  Reason: Sciatica  Requested provider: Physical Therapy  Comment:  I have been experiencing increasingly painful sciatica for over a week now.  Stretching at home hasn't alleviated the problem so far, and I am in excruciating pain now.  I need professional help to get me back to a painless state.  Which physical therapists are on my health plan that I can see?     Thank you very much for your prompt assistance and reply!    Alejandro

## 2022-04-28 ENCOUNTER — APPOINTMENT (OUTPATIENT)
Dept: RADIOLOGY | Facility: MEDICAL CENTER | Age: 62
End: 2022-04-28
Payer: COMMERCIAL

## 2022-04-28 ENCOUNTER — HOSPITAL ENCOUNTER (EMERGENCY)
Facility: MEDICAL CENTER | Age: 62
End: 2022-04-28
Attending: EMERGENCY MEDICINE
Payer: COMMERCIAL

## 2022-04-28 ENCOUNTER — OFFICE VISIT (OUTPATIENT)
Dept: URGENT CARE | Facility: PHYSICIAN GROUP | Age: 62
End: 2022-04-28
Payer: COMMERCIAL

## 2022-04-28 VITALS
RESPIRATION RATE: 18 BRPM | HEIGHT: 62 IN | OXYGEN SATURATION: 96 % | DIASTOLIC BLOOD PRESSURE: 73 MMHG | WEIGHT: 112.43 LBS | SYSTOLIC BLOOD PRESSURE: 143 MMHG | BODY MASS INDEX: 20.69 KG/M2 | TEMPERATURE: 97.6 F | HEART RATE: 73 BPM

## 2022-04-28 VITALS
DIASTOLIC BLOOD PRESSURE: 78 MMHG | TEMPERATURE: 98.5 F | RESPIRATION RATE: 16 BRPM | WEIGHT: 112 LBS | SYSTOLIC BLOOD PRESSURE: 130 MMHG | HEART RATE: 66 BPM | HEIGHT: 62 IN | OXYGEN SATURATION: 98 % | BODY MASS INDEX: 20.61 KG/M2

## 2022-04-28 DIAGNOSIS — R07.9 CHEST PAIN, UNSPECIFIED TYPE: ICD-10-CM

## 2022-04-28 LAB
ALBUMIN SERPL BCP-MCNC: 4.7 G/DL (ref 3.2–4.9)
ALBUMIN/GLOB SERPL: 1.7 G/DL
ALP SERPL-CCNC: 55 U/L (ref 30–99)
ALT SERPL-CCNC: 20 U/L (ref 2–50)
ANION GAP SERPL CALC-SCNC: 12 MMOL/L (ref 7–16)
AST SERPL-CCNC: 25 U/L (ref 12–45)
BASOPHILS # BLD AUTO: 0.7 % (ref 0–1.8)
BASOPHILS # BLD: 0.04 K/UL (ref 0–0.12)
BILIRUB SERPL-MCNC: 0.5 MG/DL (ref 0.1–1.5)
BUN SERPL-MCNC: 33 MG/DL (ref 8–22)
CALCIUM SERPL-MCNC: 10.9 MG/DL (ref 8.5–10.5)
CHLORIDE SERPL-SCNC: 103 MMOL/L (ref 96–112)
CO2 SERPL-SCNC: 27 MMOL/L (ref 20–33)
CREAT SERPL-MCNC: 0.84 MG/DL (ref 0.5–1.4)
EKG IMPRESSION: NORMAL
EOSINOPHIL # BLD AUTO: 0.1 K/UL (ref 0–0.51)
EOSINOPHIL NFR BLD: 1.7 % (ref 0–6.9)
ERYTHROCYTE [DISTWIDTH] IN BLOOD BY AUTOMATED COUNT: 45.1 FL (ref 35.9–50)
GFR SERPLBLD CREATININE-BSD FMLA CKD-EPI: 80 ML/MIN/1.73 M 2
GLOBULIN SER CALC-MCNC: 2.7 G/DL (ref 1.9–3.5)
GLUCOSE SERPL-MCNC: 89 MG/DL (ref 65–99)
HCT VFR BLD AUTO: 43.3 % (ref 37–47)
HGB BLD-MCNC: 14.3 G/DL (ref 12–16)
IMM GRANULOCYTES # BLD AUTO: 0.02 K/UL (ref 0–0.11)
IMM GRANULOCYTES NFR BLD AUTO: 0.3 % (ref 0–0.9)
LYMPHOCYTES # BLD AUTO: 2.96 K/UL (ref 1–4.8)
LYMPHOCYTES NFR BLD: 51.3 % (ref 22–41)
MCH RBC QN AUTO: 30.8 PG (ref 27–33)
MCHC RBC AUTO-ENTMCNC: 33 G/DL (ref 33.6–35)
MCV RBC AUTO: 93.1 FL (ref 81.4–97.8)
MONOCYTES # BLD AUTO: 0.45 K/UL (ref 0–0.85)
MONOCYTES NFR BLD AUTO: 7.8 % (ref 0–13.4)
NEUTROPHILS # BLD AUTO: 2.2 K/UL (ref 2–7.15)
NEUTROPHILS NFR BLD: 38.2 % (ref 44–72)
NRBC # BLD AUTO: 0 K/UL
NRBC BLD-RTO: 0 /100 WBC
PLATELET # BLD AUTO: 316 K/UL (ref 164–446)
PMV BLD AUTO: 9.2 FL (ref 9–12.9)
POTASSIUM SERPL-SCNC: 3.9 MMOL/L (ref 3.6–5.5)
PROT SERPL-MCNC: 7.4 G/DL (ref 6–8.2)
RBC # BLD AUTO: 4.65 M/UL (ref 4.2–5.4)
SODIUM SERPL-SCNC: 142 MMOL/L (ref 135–145)
TROPONIN T SERPL-MCNC: 6 NG/L (ref 6–19)
WBC # BLD AUTO: 5.8 K/UL (ref 4.8–10.8)

## 2022-04-28 PROCEDURE — 71045 X-RAY EXAM CHEST 1 VIEW: CPT

## 2022-04-28 PROCEDURE — 93005 ELECTROCARDIOGRAM TRACING: CPT | Performed by: EMERGENCY MEDICINE

## 2022-04-28 PROCEDURE — 93005 ELECTROCARDIOGRAM TRACING: CPT

## 2022-04-28 PROCEDURE — 99283 EMERGENCY DEPT VISIT LOW MDM: CPT

## 2022-04-28 PROCEDURE — 99213 OFFICE O/P EST LOW 20 MIN: CPT | Performed by: PHYSICIAN ASSISTANT

## 2022-04-28 PROCEDURE — 85025 COMPLETE CBC W/AUTO DIFF WBC: CPT

## 2022-04-28 PROCEDURE — 84484 ASSAY OF TROPONIN QUANT: CPT

## 2022-04-28 PROCEDURE — 80053 COMPREHEN METABOLIC PANEL: CPT

## 2022-04-28 PROCEDURE — 36415 COLL VENOUS BLD VENIPUNCTURE: CPT

## 2022-04-28 RX ORDER — GABAPENTIN 100 MG/1
100 CAPSULE ORAL 3 TIMES DAILY
COMMUNITY

## 2022-04-28 ASSESSMENT — PAIN DESCRIPTION - DESCRIPTORS: DESCRIPTORS: ACHING

## 2022-04-28 ASSESSMENT — FIBROSIS 4 INDEX
FIB4 SCORE: 1.164171000174398273
FIB4 SCORE: 1.164171000174398273

## 2022-04-29 ASSESSMENT — ENCOUNTER SYMPTOMS
COUGH: 0
MYALGIAS: 0
FEVER: 0
DIARRHEA: 0
BACK PAIN: 1
CHILLS: 0
SORE THROAT: 0
EYE PAIN: 0
CONSTIPATION: 0
VOMITING: 0
SHORTNESS OF BREATH: 0
NAUSEA: 0
HEADACHES: 0
ABDOMINAL PAIN: 0

## 2022-04-29 NOTE — DISCHARGE INSTRUCTIONS
Please follow-up with your primary care physician for further evaluation.  Come back to emergency department for symptoms worsen.  Thank you for coming in today.

## 2022-04-29 NOTE — ED NOTES
"Pt discharged home. Pt in possession of belongings. Pt provided discharge education and information pertaining to medications and follow up appointments. Pt received copy of discharge instructions and verbalized understanding. /73   Pulse 73   Temp 36.4 °C (97.6 °F) (Temporal)   Resp 18   Ht 1.575 m (5' 2\")   Wt 51 kg (112 lb 7 oz)   SpO2 96%   BMI 20.56 kg/m²     "

## 2022-04-29 NOTE — ED TRIAGE NOTES
Chief Complaint   Patient presents with   • Chest Pain     Pt states she has been having chest on and off and today the pain got worse, pt called her pcp who told her to come and get a work up to rule out heart issues, pain 1/10, in the last month pt has been having these symptoms more frequently      Pt ambulatory to triage for above complaint. Pt states she is supposed to have an MRI to check her sciatic nerve, pt states she is constantly having sciatic pain.       Pt is alert/oriented and follows commands. Pt speaking in full sentences and responds appropriately to questions. No acute distress noted in triage and respirations are even and unlabored.     Pt placed in lobby and educated on triage process. Pt encouraged to alert staff for any changes in condition.

## 2022-04-30 NOTE — PROGRESS NOTES
"Subjective:   Asmita Angel is a 61 y.o. female who presents for Back Pain (C/o back/sciatica pain x2mo. Tried 7 sessions of PT with no relief. Pain is worse in the morning. Using heating pad helps. ) and Chest Pain (On and off chest pain, has been happening about once a week for the past couple months. Almost feels like indigestion.)      This a 61-year-old female who reports intermittent central chest pain with corresponding back pain that waxes and wanes and has been going on for several weeks.  She had an acute episode today when she was sitting.  She presents to urgent care stating she wants to make sure she does not have a heart attack.  When she discussed this previously with her primary care provider they said to monitor it closely and if symptoms worsen or persist she should consider going to the emergency room      Review of Systems   Constitutional: Negative for chills and fever.   HENT: Negative for congestion, ear pain and sore throat.    Eyes: Negative for pain.   Respiratory: Negative for cough and shortness of breath.    Cardiovascular: Positive for chest pain.   Gastrointestinal: Negative for abdominal pain, constipation, diarrhea, nausea and vomiting.   Genitourinary: Negative for dysuria.   Musculoskeletal: Positive for back pain. Negative for myalgias.   Skin: Negative for rash.   Neurological: Negative for headaches.       Medications, Allergies, and current problem list reviewed today in Epic.     Objective:     /78 (BP Location: Left arm, Patient Position: Sitting)   Pulse 66   Temp 36.9 °C (98.5 °F)   Resp 16   Ht 1.575 m (5' 2\")   Wt 50.8 kg (112 lb)   SpO2 98%     Physical Exam  Vitals reviewed.   Constitutional:       Appearance: Normal appearance.   HENT:      Head: Normocephalic and atraumatic.      Right Ear: External ear normal.      Left Ear: External ear normal.      Nose: Nose normal.      Mouth/Throat:      Mouth: Mucous membranes are moist.   Eyes:      " Conjunctiva/sclera: Conjunctivae normal.   Cardiovascular:      Rate and Rhythm: Normal rate and regular rhythm.   Pulmonary:      Effort: Pulmonary effort is normal.      Breath sounds: Normal breath sounds.   Skin:     General: Skin is warm and dry.      Capillary Refill: Capillary refill takes less than 2 seconds.   Neurological:      Mental Status: She is alert and oriented to person, place, and time.         Assessment/Plan:     Diagnosis and associated orders:     1. Chest pain, unspecified type        Comments/MDM:     • Unable to rule out a heart attack from urgent care.  Discussed with patient and recommended ER evaluation.         Differential diagnosis, natural history, supportive care, and indications for immediate follow-up discussed.    Advised the patient to follow-up with the primary care physician for recheck, reevaluation, and consideration of further management.    Please note that this dictation was created using voice recognition software. I have made a reasonable attempt to correct obvious errors, but I expect that there are errors of grammar and possibly content that I did not discover before finalizing the note.    This note was electronically signed by Jaxon Garcia PA-C

## 2022-05-04 ENCOUNTER — HOSPITAL ENCOUNTER (OUTPATIENT)
Dept: RADIOLOGY | Facility: MEDICAL CENTER | Age: 62
End: 2022-05-04
Attending: PHYSICAL MEDICINE & REHABILITATION
Payer: COMMERCIAL

## 2022-05-04 DIAGNOSIS — M54.50 LOW BACK PAIN, UNSPECIFIED BACK PAIN LATERALITY, UNSPECIFIED CHRONICITY, UNSPECIFIED WHETHER SCIATICA PRESENT: ICD-10-CM

## 2022-05-04 PROCEDURE — 72148 MRI LUMBAR SPINE W/O DYE: CPT

## 2022-05-17 ENCOUNTER — HOSPITAL ENCOUNTER (OUTPATIENT)
Dept: LAB | Facility: MEDICAL CENTER | Age: 62
End: 2022-05-17
Attending: FAMILY MEDICINE
Payer: COMMERCIAL

## 2022-05-17 LAB
ALBUMIN SERPL BCP-MCNC: 4.9 G/DL (ref 3.2–4.9)
ALBUMIN/GLOB SERPL: 2 G/DL
ALP SERPL-CCNC: 47 U/L (ref 30–99)
ALT SERPL-CCNC: 19 U/L (ref 2–50)
ANION GAP SERPL CALC-SCNC: 11 MMOL/L (ref 7–16)
APPEARANCE UR: CLEAR
AST SERPL-CCNC: 25 U/L (ref 12–45)
BASOPHILS # BLD AUTO: 1.2 % (ref 0–1.8)
BASOPHILS # BLD: 0.05 K/UL (ref 0–0.12)
BILIRUB SERPL-MCNC: 0.3 MG/DL (ref 0.1–1.5)
BILIRUB UR QL STRIP.AUTO: NEGATIVE
BUN SERPL-MCNC: 29 MG/DL (ref 8–22)
CALCIUM SERPL-MCNC: 9.9 MG/DL (ref 8.5–10.5)
CHLORIDE SERPL-SCNC: 103 MMOL/L (ref 96–112)
CHOLEST SERPL-MCNC: 195 MG/DL (ref 100–199)
CO2 SERPL-SCNC: 25 MMOL/L (ref 20–33)
COLOR UR: YELLOW
CREAT SERPL-MCNC: 0.73 MG/DL (ref 0.5–1.4)
CREAT UR-MCNC: 32.42 MG/DL
EOSINOPHIL # BLD AUTO: 0.11 K/UL (ref 0–0.51)
EOSINOPHIL NFR BLD: 2.5 % (ref 0–6.9)
ERYTHROCYTE [DISTWIDTH] IN BLOOD BY AUTOMATED COUNT: 43.5 FL (ref 35.9–50)
EST. AVERAGE GLUCOSE BLD GHB EST-MCNC: 117 MG/DL
FASTING STATUS PATIENT QL REPORTED: NORMAL
GFR SERPLBLD CREATININE-BSD FMLA CKD-EPI: 94 ML/MIN/1.73 M 2
GLOBULIN SER CALC-MCNC: 2.4 G/DL (ref 1.9–3.5)
GLUCOSE SERPL-MCNC: 95 MG/DL (ref 65–99)
GLUCOSE UR STRIP.AUTO-MCNC: NEGATIVE MG/DL
HBA1C MFR BLD: 5.7 % (ref 4–5.6)
HCT VFR BLD AUTO: 41.2 % (ref 37–47)
HDLC SERPL-MCNC: 58 MG/DL
HGB BLD-MCNC: 13.9 G/DL (ref 12–16)
IMM GRANULOCYTES # BLD AUTO: 0.01 K/UL (ref 0–0.11)
IMM GRANULOCYTES NFR BLD AUTO: 0.2 % (ref 0–0.9)
KETONES UR STRIP.AUTO-MCNC: NEGATIVE MG/DL
LDLC SERPL CALC-MCNC: 123 MG/DL
LEUKOCYTE ESTERASE UR QL STRIP.AUTO: NEGATIVE
LYMPHOCYTES # BLD AUTO: 1.93 K/UL (ref 1–4.8)
LYMPHOCYTES NFR BLD: 44.6 % (ref 22–41)
MCH RBC QN AUTO: 31.7 PG (ref 27–33)
MCHC RBC AUTO-ENTMCNC: 33.7 G/DL (ref 33.6–35)
MCV RBC AUTO: 93.8 FL (ref 81.4–97.8)
MICRO URNS: NORMAL
MICROALBUMIN UR-MCNC: <1.2 MG/DL
MICROALBUMIN/CREAT UR: NORMAL MG/G (ref 0–30)
MONOCYTES # BLD AUTO: 0.31 K/UL (ref 0–0.85)
MONOCYTES NFR BLD AUTO: 7.2 % (ref 0–13.4)
NEUTROPHILS # BLD AUTO: 1.92 K/UL (ref 2–7.15)
NEUTROPHILS NFR BLD: 44.3 % (ref 44–72)
NITRITE UR QL STRIP.AUTO: NEGATIVE
NRBC # BLD AUTO: 0 K/UL
NRBC BLD-RTO: 0 /100 WBC
PH UR STRIP.AUTO: 6 [PH] (ref 5–8)
PLATELET # BLD AUTO: 322 K/UL (ref 164–446)
PMV BLD AUTO: 9.5 FL (ref 9–12.9)
POTASSIUM SERPL-SCNC: 4.3 MMOL/L (ref 3.6–5.5)
PROT SERPL-MCNC: 7.3 G/DL (ref 6–8.2)
PROT UR QL STRIP: NEGATIVE MG/DL
RBC # BLD AUTO: 4.39 M/UL (ref 4.2–5.4)
RBC UR QL AUTO: NEGATIVE
SODIUM SERPL-SCNC: 139 MMOL/L (ref 135–145)
SP GR UR STRIP.AUTO: 1.01
TRIGL SERPL-MCNC: 72 MG/DL (ref 0–149)
TSH SERPL DL<=0.005 MIU/L-ACNC: 2.35 UIU/ML (ref 0.38–5.33)
UROBILINOGEN UR STRIP.AUTO-MCNC: 0.2 MG/DL
WBC # BLD AUTO: 4.3 K/UL (ref 4.8–10.8)

## 2022-05-17 PROCEDURE — 82306 VITAMIN D 25 HYDROXY: CPT

## 2022-05-17 PROCEDURE — 36415 COLL VENOUS BLD VENIPUNCTURE: CPT

## 2022-05-17 PROCEDURE — 80061 LIPID PANEL: CPT

## 2022-05-17 PROCEDURE — 85025 COMPLETE CBC W/AUTO DIFF WBC: CPT

## 2022-05-17 PROCEDURE — 80053 COMPREHEN METABOLIC PANEL: CPT

## 2022-05-17 PROCEDURE — 83036 HEMOGLOBIN GLYCOSYLATED A1C: CPT

## 2022-05-17 PROCEDURE — 81003 URINALYSIS AUTO W/O SCOPE: CPT

## 2022-05-17 PROCEDURE — 82043 UR ALBUMIN QUANTITATIVE: CPT

## 2022-05-17 PROCEDURE — 82570 ASSAY OF URINE CREATININE: CPT

## 2022-05-17 PROCEDURE — 84443 ASSAY THYROID STIM HORMONE: CPT

## 2022-05-21 LAB — 25(OH)D3 SERPL-MCNC: 52 NG/ML (ref 30–80)

## 2022-06-10 ENCOUNTER — HOSPITAL ENCOUNTER (OUTPATIENT)
Dept: RADIOLOGY | Facility: MEDICAL CENTER | Age: 62
End: 2022-06-10
Attending: OTOLARYNGOLOGY
Payer: COMMERCIAL

## 2022-06-10 DIAGNOSIS — H90.42 SENSORINEURAL HEARING LOSS, UNILATERAL, LEFT EAR, WITH UNRESTRICTED HEARING ON THE CONTRALATERAL SIDE: ICD-10-CM

## 2022-06-10 PROCEDURE — 700117 HCHG RX CONTRAST REV CODE 255: Performed by: OTOLARYNGOLOGY

## 2022-06-10 PROCEDURE — 70553 MRI BRAIN STEM W/O & W/DYE: CPT

## 2022-06-10 PROCEDURE — A9576 INJ PROHANCE MULTIPACK: HCPCS | Performed by: OTOLARYNGOLOGY

## 2022-06-10 RX ADMIN — GADOTERIDOL 10 ML: 279.3 INJECTION, SOLUTION INTRAVENOUS at 09:25

## 2022-06-20 ENCOUNTER — APPOINTMENT (RX ONLY)
Dept: URBAN - METROPOLITAN AREA CLINIC 4 | Facility: CLINIC | Age: 62
Setting detail: DERMATOLOGY
End: 2022-06-20

## 2022-06-20 DIAGNOSIS — L81.4 OTHER MELANIN HYPERPIGMENTATION: ICD-10-CM

## 2022-06-20 DIAGNOSIS — D22 MELANOCYTIC NEVI: ICD-10-CM

## 2022-06-20 DIAGNOSIS — L81.1 CHLOASMA: ICD-10-CM

## 2022-06-20 DIAGNOSIS — L82.1 OTHER SEBORRHEIC KERATOSIS: ICD-10-CM

## 2022-06-20 PROBLEM — D22.39 MELANOCYTIC NEVI OF OTHER PARTS OF FACE: Status: ACTIVE | Noted: 2022-06-20

## 2022-06-20 PROBLEM — D22.5 MELANOCYTIC NEVI OF TRUNK: Status: ACTIVE | Noted: 2022-06-20

## 2022-06-20 PROBLEM — D22.71 MELANOCYTIC NEVI OF RIGHT LOWER LIMB, INCLUDING HIP: Status: ACTIVE | Noted: 2022-06-20

## 2022-06-20 PROCEDURE — ? PRESCRIPTION

## 2022-06-20 PROCEDURE — 99213 OFFICE O/P EST LOW 20 MIN: CPT

## 2022-06-20 PROCEDURE — ? COUNSELING

## 2022-06-20 PROCEDURE — ? ADDITIONAL NOTES

## 2022-06-20 RX ORDER — H-QUINONE/TRETINOIN/HYDROCORT 8 %-0.025%
1 EMULSION (GRAM) TOPICAL QD
Qty: 30 | Refills: 6 | Status: ERX | COMMUNITY
Start: 2022-06-20

## 2022-06-20 RX ADMIN — Medication 1: at 00:00

## 2022-06-20 ASSESSMENT — LOCATION ZONE DERM
LOCATION ZONE: NECK
LOCATION ZONE: LEG
LOCATION ZONE: FACE
LOCATION ZONE: TRUNK

## 2022-06-20 ASSESSMENT — LOCATION DETAILED DESCRIPTION DERM
LOCATION DETAILED: LEFT INFERIOR MEDIAL FOREHEAD
LOCATION DETAILED: RIGHT ANTERIOR PROXIMAL THIGH
LOCATION DETAILED: SUPERIOR LUMBAR SPINE
LOCATION DETAILED: RIGHT PROXIMAL CALF
LOCATION DETAILED: MID POSTERIOR NECK
LOCATION DETAILED: RIGHT PROXIMAL PRETIBIAL REGION
LOCATION DETAILED: LEFT INFERIOR CENTRAL MALAR CHEEK
LOCATION DETAILED: RIGHT SUPERIOR LATERAL UPPER BACK
LOCATION DETAILED: RIGHT LATERAL UPPER BACK
LOCATION DETAILED: RIGHT DISTAL PRETIBIAL REGION
LOCATION DETAILED: LEFT MEDIAL FOREHEAD

## 2022-06-20 ASSESSMENT — LOCATION SIMPLE DESCRIPTION DERM
LOCATION SIMPLE: RIGHT PRETIBIAL REGION
LOCATION SIMPLE: LEFT FOREHEAD
LOCATION SIMPLE: RIGHT CALF
LOCATION SIMPLE: LOWER BACK
LOCATION SIMPLE: RIGHT BACK
LOCATION SIMPLE: RIGHT THIGH
LOCATION SIMPLE: LEFT CHEEK
LOCATION SIMPLE: POSTERIOR NECK
LOCATION SIMPLE: RIGHT UPPER BACK

## 2022-06-20 NOTE — PROCEDURE: ADDITIONAL NOTES
Additional Notes: Recommended compound W
Render Risk Assessment In Note?: no
Detail Level: Generalized

## 2022-07-08 ENCOUNTER — HOSPITAL ENCOUNTER (OUTPATIENT)
Dept: RADIOLOGY | Facility: MEDICAL CENTER | Age: 62
End: 2022-07-08
Attending: NEUROLOGICAL SURGERY
Payer: COMMERCIAL

## 2022-07-08 DIAGNOSIS — M54.16 LUMBAR RADICULOPATHY: ICD-10-CM

## 2022-07-08 PROCEDURE — 72110 X-RAY EXAM L-2 SPINE 4/>VWS: CPT

## 2022-10-17 ENCOUNTER — HOSPITAL ENCOUNTER (OUTPATIENT)
Dept: LAB | Facility: MEDICAL CENTER | Age: 62
End: 2022-10-17
Attending: NEUROLOGICAL SURGERY
Payer: COMMERCIAL

## 2022-10-17 ENCOUNTER — HOSPITAL ENCOUNTER (OUTPATIENT)
Dept: CARDIOLOGY | Facility: MEDICAL CENTER | Age: 62
End: 2022-10-17
Attending: NEUROLOGICAL SURGERY
Payer: COMMERCIAL

## 2022-10-17 ENCOUNTER — HOSPITAL ENCOUNTER (OUTPATIENT)
Dept: RADIOLOGY | Facility: MEDICAL CENTER | Age: 62
End: 2022-10-17
Attending: NEUROLOGICAL SURGERY
Payer: COMMERCIAL

## 2022-10-17 DIAGNOSIS — Z01.810 PRE-OPERATIVE CARDIOVASCULAR EXAMINATION: ICD-10-CM

## 2022-10-17 LAB
ANION GAP SERPL CALC-SCNC: 13 MMOL/L (ref 7–16)
APTT PPP: 35 SEC (ref 24.7–36)
BASOPHILS # BLD AUTO: 1.5 % (ref 0–1.8)
BASOPHILS # BLD: 0.05 K/UL (ref 0–0.12)
BUN SERPL-MCNC: 27 MG/DL (ref 8–22)
CALCIUM SERPL-MCNC: 9.7 MG/DL (ref 8.5–10.5)
CHLORIDE SERPL-SCNC: 104 MMOL/L (ref 96–112)
CO2 SERPL-SCNC: 25 MMOL/L (ref 20–33)
CREAT SERPL-MCNC: 0.77 MG/DL (ref 0.5–1.4)
EKG IMPRESSION: NORMAL
EOSINOPHIL # BLD AUTO: 0.1 K/UL (ref 0–0.51)
EOSINOPHIL NFR BLD: 3 % (ref 0–6.9)
ERYTHROCYTE [DISTWIDTH] IN BLOOD BY AUTOMATED COUNT: 44.2 FL (ref 35.9–50)
GFR SERPLBLD CREATININE-BSD FMLA CKD-EPI: 88 ML/MIN/1.73 M 2
GLUCOSE SERPL-MCNC: 96 MG/DL (ref 65–99)
HCT VFR BLD AUTO: 41.2 % (ref 37–47)
HGB BLD-MCNC: 13.3 G/DL (ref 12–16)
IMM GRANULOCYTES # BLD AUTO: 0 K/UL (ref 0–0.11)
IMM GRANULOCYTES NFR BLD AUTO: 0 % (ref 0–0.9)
INR PPP: 0.98 (ref 0.87–1.13)
LYMPHOCYTES # BLD AUTO: 1.56 K/UL (ref 1–4.8)
LYMPHOCYTES NFR BLD: 46.8 % (ref 22–41)
MCH RBC QN AUTO: 30.4 PG (ref 27–33)
MCHC RBC AUTO-ENTMCNC: 32.3 G/DL (ref 33.6–35)
MCV RBC AUTO: 94.1 FL (ref 81.4–97.8)
MONOCYTES # BLD AUTO: 0.25 K/UL (ref 0–0.85)
MONOCYTES NFR BLD AUTO: 7.5 % (ref 0–13.4)
NEUTROPHILS # BLD AUTO: 1.37 K/UL (ref 2–7.15)
NEUTROPHILS NFR BLD: 41.2 % (ref 44–72)
NRBC # BLD AUTO: 0 K/UL
NRBC BLD-RTO: 0 /100 WBC
PLATELET # BLD AUTO: 298 K/UL (ref 164–446)
PMV BLD AUTO: 10 FL (ref 9–12.9)
POTASSIUM SERPL-SCNC: 4.2 MMOL/L (ref 3.6–5.5)
PROTHROMBIN TIME: 12.9 SEC (ref 12–14.6)
RBC # BLD AUTO: 4.38 M/UL (ref 4.2–5.4)
SODIUM SERPL-SCNC: 142 MMOL/L (ref 135–145)
WBC # BLD AUTO: 3.3 K/UL (ref 4.8–10.8)

## 2022-10-17 PROCEDURE — 80048 BASIC METABOLIC PNL TOTAL CA: CPT

## 2022-10-17 PROCEDURE — 85025 COMPLETE CBC W/AUTO DIFF WBC: CPT

## 2022-10-17 PROCEDURE — 85610 PROTHROMBIN TIME: CPT

## 2022-10-17 PROCEDURE — 36415 COLL VENOUS BLD VENIPUNCTURE: CPT

## 2022-10-17 PROCEDURE — 93010 ELECTROCARDIOGRAM REPORT: CPT | Performed by: INTERNAL MEDICINE

## 2022-10-17 PROCEDURE — 93005 ELECTROCARDIOGRAM TRACING: CPT

## 2022-10-17 PROCEDURE — 71046 X-RAY EXAM CHEST 2 VIEWS: CPT

## 2022-10-17 PROCEDURE — 85730 THROMBOPLASTIN TIME PARTIAL: CPT

## 2022-10-20 ENCOUNTER — HOSPITAL ENCOUNTER (OUTPATIENT)
Dept: LAB | Facility: MEDICAL CENTER | Age: 62
End: 2022-10-20
Attending: NEUROLOGICAL SURGERY
Payer: COMMERCIAL

## 2022-10-20 LAB
BASOPHILS # BLD AUTO: 1.2 % (ref 0–1.8)
BASOPHILS # BLD: 0.05 K/UL (ref 0–0.12)
EOSINOPHIL # BLD AUTO: 0.1 K/UL (ref 0–0.51)
EOSINOPHIL NFR BLD: 2.4 % (ref 0–6.9)
ERYTHROCYTE [DISTWIDTH] IN BLOOD BY AUTOMATED COUNT: 43.8 FL (ref 35.9–50)
HCT VFR BLD AUTO: 41.3 % (ref 37–47)
HGB BLD-MCNC: 13.8 G/DL (ref 12–16)
IMM GRANULOCYTES # BLD AUTO: 0 K/UL (ref 0–0.11)
IMM GRANULOCYTES NFR BLD AUTO: 0 % (ref 0–0.9)
LYMPHOCYTES # BLD AUTO: 1.83 K/UL (ref 1–4.8)
LYMPHOCYTES NFR BLD: 44.4 % (ref 22–41)
MCH RBC QN AUTO: 31.2 PG (ref 27–33)
MCHC RBC AUTO-ENTMCNC: 33.4 G/DL (ref 33.6–35)
MCV RBC AUTO: 93.2 FL (ref 81.4–97.8)
MONOCYTES # BLD AUTO: 0.25 K/UL (ref 0–0.85)
MONOCYTES NFR BLD AUTO: 6.1 % (ref 0–13.4)
NEUTROPHILS # BLD AUTO: 1.89 K/UL (ref 2–7.15)
NEUTROPHILS NFR BLD: 45.9 % (ref 44–72)
NRBC # BLD AUTO: 0 K/UL
NRBC BLD-RTO: 0 /100 WBC
PLATELET # BLD AUTO: 327 K/UL (ref 164–446)
PMV BLD AUTO: 9.8 FL (ref 9–12.9)
RBC # BLD AUTO: 4.43 M/UL (ref 4.2–5.4)
WBC # BLD AUTO: 4.1 K/UL (ref 4.8–10.8)

## 2022-10-20 PROCEDURE — 36415 COLL VENOUS BLD VENIPUNCTURE: CPT

## 2022-10-20 PROCEDURE — 85025 COMPLETE CBC W/AUTO DIFF WBC: CPT

## 2022-11-16 ENCOUNTER — HOSPITAL ENCOUNTER (OUTPATIENT)
Dept: LAB | Facility: MEDICAL CENTER | Age: 62
End: 2022-11-16
Attending: FAMILY MEDICINE
Payer: COMMERCIAL

## 2022-11-16 ENCOUNTER — HOSPITAL ENCOUNTER (OUTPATIENT)
Dept: RADIOLOGY | Facility: MEDICAL CENTER | Age: 62
End: 2022-11-16
Attending: FAMILY MEDICINE
Payer: COMMERCIAL

## 2022-11-16 DIAGNOSIS — Z12.31 VISIT FOR SCREENING MAMMOGRAM: ICD-10-CM

## 2022-11-16 LAB
25(OH)D3 SERPL-MCNC: 48 NG/ML (ref 30–100)
ALBUMIN SERPL BCP-MCNC: 4.7 G/DL (ref 3.2–4.9)
ALBUMIN/GLOB SERPL: 1.9 G/DL
ALP SERPL-CCNC: 50 U/L (ref 30–99)
ALT SERPL-CCNC: 19 U/L (ref 2–50)
ANION GAP SERPL CALC-SCNC: 9 MMOL/L (ref 7–16)
AST SERPL-CCNC: 30 U/L (ref 12–45)
BASOPHILS # BLD AUTO: 1.6 % (ref 0–1.8)
BASOPHILS # BLD: 0.06 K/UL (ref 0–0.12)
BILIRUB SERPL-MCNC: 0.4 MG/DL (ref 0.1–1.5)
BUN SERPL-MCNC: 22 MG/DL (ref 8–22)
CALCIUM SERPL-MCNC: 9.9 MG/DL (ref 8.5–10.5)
CHLORIDE SERPL-SCNC: 104 MMOL/L (ref 96–112)
CHOLEST SERPL-MCNC: 197 MG/DL (ref 100–199)
CO2 SERPL-SCNC: 27 MMOL/L (ref 20–33)
CREAT SERPL-MCNC: 0.8 MG/DL (ref 0.5–1.4)
EOSINOPHIL # BLD AUTO: 0.15 K/UL (ref 0–0.51)
EOSINOPHIL NFR BLD: 3.9 % (ref 0–6.9)
ERYTHROCYTE [DISTWIDTH] IN BLOOD BY AUTOMATED COUNT: 45.3 FL (ref 35.9–50)
EST. AVERAGE GLUCOSE BLD GHB EST-MCNC: 120 MG/DL
FASTING STATUS PATIENT QL REPORTED: NORMAL
GFR SERPLBLD CREATININE-BSD FMLA CKD-EPI: 84 ML/MIN/1.73 M 2
GLOBULIN SER CALC-MCNC: 2.5 G/DL (ref 1.9–3.5)
GLUCOSE SERPL-MCNC: 99 MG/DL (ref 65–99)
HBA1C MFR BLD: 5.8 % (ref 4–5.6)
HCT VFR BLD AUTO: 39.2 % (ref 37–47)
HDLC SERPL-MCNC: 50 MG/DL
HGB BLD-MCNC: 12.8 G/DL (ref 12–16)
IMM GRANULOCYTES # BLD AUTO: 0.01 K/UL (ref 0–0.11)
IMM GRANULOCYTES NFR BLD AUTO: 0.3 % (ref 0–0.9)
LDLC SERPL CALC-MCNC: 126 MG/DL
LYMPHOCYTES # BLD AUTO: 1.82 K/UL (ref 1–4.8)
LYMPHOCYTES NFR BLD: 47.8 % (ref 22–41)
MCH RBC QN AUTO: 30.7 PG (ref 27–33)
MCHC RBC AUTO-ENTMCNC: 32.7 G/DL (ref 33.6–35)
MCV RBC AUTO: 94 FL (ref 81.4–97.8)
MONOCYTES # BLD AUTO: 0.26 K/UL (ref 0–0.85)
MONOCYTES NFR BLD AUTO: 6.8 % (ref 0–13.4)
NEUTROPHILS # BLD AUTO: 1.51 K/UL (ref 2–7.15)
NEUTROPHILS NFR BLD: 39.6 % (ref 44–72)
NRBC # BLD AUTO: 0 K/UL
NRBC BLD-RTO: 0 /100 WBC
PLATELET # BLD AUTO: 304 K/UL (ref 164–446)
PMV BLD AUTO: 9.9 FL (ref 9–12.9)
POTASSIUM SERPL-SCNC: 4.2 MMOL/L (ref 3.6–5.5)
PROT SERPL-MCNC: 7.2 G/DL (ref 6–8.2)
RBC # BLD AUTO: 4.17 M/UL (ref 4.2–5.4)
SODIUM SERPL-SCNC: 140 MMOL/L (ref 135–145)
TRIGL SERPL-MCNC: 104 MG/DL (ref 0–149)
TSH SERPL DL<=0.005 MIU/L-ACNC: 2.75 UIU/ML (ref 0.38–5.33)
VIT B12 SERPL-MCNC: 2422 PG/ML (ref 211–911)
WBC # BLD AUTO: 3.8 K/UL (ref 4.8–10.8)

## 2022-11-16 PROCEDURE — 77063 BREAST TOMOSYNTHESIS BI: CPT

## 2022-11-16 PROCEDURE — 80053 COMPREHEN METABOLIC PANEL: CPT

## 2022-11-16 PROCEDURE — 83036 HEMOGLOBIN GLYCOSYLATED A1C: CPT

## 2022-11-16 PROCEDURE — 84443 ASSAY THYROID STIM HORMONE: CPT

## 2022-11-16 PROCEDURE — 36415 COLL VENOUS BLD VENIPUNCTURE: CPT

## 2022-11-16 PROCEDURE — 80061 LIPID PANEL: CPT

## 2022-11-16 PROCEDURE — 82306 VITAMIN D 25 HYDROXY: CPT

## 2022-11-16 PROCEDURE — 85025 COMPLETE CBC W/AUTO DIFF WBC: CPT

## 2022-11-16 PROCEDURE — 82607 VITAMIN B-12: CPT

## 2022-12-11 NOTE — PROGRESS NOTES
"[facility] Progress Note  [address] [place] [address]  Dept: [phone]      Patient Name: Asmita Angel   MRN: [id number]  YOB: 1960  PCP: Janki Miller M.D.  Date of Service: 7/31/2017  5:00 PM    CC Asmita Allen  - multiple complaints - R knee pain. Dry eye, allergic rhinitis.     HPI R knee is hurting her quite a bit.  On and off she feels that the tendon gets twisted.  Last thursday it hurt quite badly - she did see the chiropractor.  She's turning her foot out to walk.  Difficult to put weight on it.  No locking or catching.  Saw her GP today.  Had an XRay of it today, but the results aren't back yet.    She'd like work on her dry eyes and allergies. She states that the smoke is bothering her - has been coughing quite a bit.    She states that her R ear has been bothering her.  She is sensitive to pressure changes to her ear.    She usually uses a nose spray.      She states that Jose is loving [facility] and is there for the near future for his work.     She has a wound on her L foot that isn't healing as fast as she'd like and she'd like an explanation of this as well as her  lower R eyelid twitching         ROS Hearing is ok - except the R ear, where the Eustachian  Tube is more affected.  She has a pressure and nagging discomfort in that ear.  Low sodium diet.  She takes MVI and magnesium.   Skin very dry  She likes to eat chili peppers  Has an \"iron stomach\" - spicy food.  Beef is difficult for her to digest.  She likes calculations and microeconomics.  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  Notes episodes of urticaria as a child  She sleeps between 7 and 8 hours.    Not a morning person.    PMH Past Medical History   Diagnosis Date   • Essential hypertension, benign    • Glaucoma    • Mixed hyperlipidemia    • Allergic rhinitis due to pollen    • Cervicalgia    • Allergy      Past " Surgical History   Procedure Laterality Date   • Sinusotomies  2001,2006   • Cholecystectomy  2007   • Hysterectomy, total abdominal  2004     endometriosis   • Abdominal hysterectomy total       endometriosis        SH Social History     Social History   • Marital Status:      Spouse Name: Jose   • Number of Children: 0   • Years of Education: N/A     Occupational History   • [employer]     Social History Main Topics   • Smoking status: Never Smoker    • Smokeless tobacco: Never Used   • Alcohol Use: No   • Drug Use: No   • Sexual Activity:     Partners: Male     Other Topics Concern   • Not on file     Social History Narrative    Moved to [place] > 18 years ago.  Born in [facility].  Schooled in [place] - in  > 30 years.  .  Works for [employer].  She is studying finances.  No children, but  has 2.  Has sister.    \Born @ [place] ~ 18[birth year] PM in [facility][note 3] (solar noon 12:14)        MEDS Current Outpatient Prescriptions on File Prior to Visit   Medication Sig Dispense Refill   • hydrochlorothiazide (HYDRODIURIL) 12.5 MG tablet Take 1 Tab by mouth every day. 90 Tab 3   • potassium chloride ER (KLOR-CON) 10 MEQ tablet Take 1 Tab by mouth every day. 90 Tab 3   • RESTASIS 0.05 % ophthalmic emulsion      • azelastine (ASTELIN) 137 MCG/SPRAY nasal spray      • bimatoprost (LUMIGAN) 0.03 % Solution Place 1 Drop in both eyes every evening.     • Azelastine-Fluticasone 137-50 MCG/ACT Suspension Spray  in nose.       No current facility-administered medications on file prior to visit.      ALLERGIES Allergies   Allergen Reactions   • Ciprofloxacin Hives     itching      PE Pulses - not examined today  Tongue - not examined today     Assesment Eastern Stagnation  Vidal Yin [place] Magdi Axis with LR/KI stagnation  BaOscar - [place] ([place]) Fire, strong body    Western Encounter Diagnoses   Name Primary?   • Chronic pain of right knee Yes   • Dry eye    • Non-seasonal allergic  rhinitis due to other allergic trigger    • Eyelid twitch                           Plan Set 1: Vidal Yin cat Magdi 3:6, LI20  Set 2: RLE Sp6-->Xi Shane / LR5-->LR8  Set 3: Ear salivation point JES  Set 4: LUE Yin elbow x 3   >15 min spent face to face, not including procedure.  >50% of the face to face time was spent in counseling and coordination. Topics discussed included:  Etiology of healing of LE wounds and why they heal slower than other wounds.  Discussed the physiology of eyelid twitching and discussed with the patient that it may be idiopathic or it may be due to the allergens that she is suffering from / general fatigue.  Discussed her coping behaviors with her  routinely working in [employer].  Total acupuncture treatment time = 45 minutes      Martín Vargas M.D.           chest pain

## 2023-03-06 ENCOUNTER — HOSPITAL ENCOUNTER (OUTPATIENT)
Dept: RADIOLOGY | Facility: MEDICAL CENTER | Age: 63
End: 2023-03-06
Attending: STUDENT IN AN ORGANIZED HEALTH CARE EDUCATION/TRAINING PROGRAM
Payer: COMMERCIAL

## 2023-03-06 DIAGNOSIS — M54.16 RADICULOPATHY, LUMBAR REGION: ICD-10-CM

## 2023-03-06 PROCEDURE — 700117 HCHG RX CONTRAST REV CODE 255

## 2023-03-06 PROCEDURE — A9579 GAD-BASE MR CONTRAST NOS,1ML: HCPCS

## 2023-03-06 PROCEDURE — 72110 X-RAY EXAM L-2 SPINE 4/>VWS: CPT

## 2023-03-06 PROCEDURE — 72158 MRI LUMBAR SPINE W/O & W/DYE: CPT

## 2023-03-06 RX ADMIN — GADOTERIDOL 10 ML: 279.3 INJECTION, SOLUTION INTRAVENOUS at 15:21

## 2023-06-07 ENCOUNTER — HOSPITAL ENCOUNTER (OUTPATIENT)
Dept: LAB | Facility: MEDICAL CENTER | Age: 63
End: 2023-06-07
Attending: FAMILY MEDICINE
Payer: COMMERCIAL

## 2023-06-07 LAB
ALBUMIN SERPL BCP-MCNC: 4.6 G/DL (ref 3.2–4.9)
ALBUMIN/GLOB SERPL: 1.8 G/DL
ALP SERPL-CCNC: 51 U/L (ref 30–99)
ALT SERPL-CCNC: 20 U/L (ref 2–50)
ANION GAP SERPL CALC-SCNC: 12 MMOL/L (ref 7–16)
AST SERPL-CCNC: 29 U/L (ref 12–45)
BASOPHILS # BLD AUTO: 1.1 % (ref 0–1.8)
BASOPHILS # BLD: 0.04 K/UL (ref 0–0.12)
BILIRUB SERPL-MCNC: 0.4 MG/DL (ref 0.1–1.5)
BUN SERPL-MCNC: 24 MG/DL (ref 8–22)
CALCIUM ALBUM COR SERPL-MCNC: 9.7 MG/DL (ref 8.5–10.5)
CALCIUM SERPL-MCNC: 10.2 MG/DL (ref 8.5–10.5)
CHLORIDE SERPL-SCNC: 104 MMOL/L (ref 96–112)
CHOLEST SERPL-MCNC: 196 MG/DL (ref 100–199)
CO2 SERPL-SCNC: 25 MMOL/L (ref 20–33)
CREAT SERPL-MCNC: 0.82 MG/DL (ref 0.5–1.4)
EOSINOPHIL # BLD AUTO: 0.09 K/UL (ref 0–0.51)
EOSINOPHIL NFR BLD: 2.5 % (ref 0–6.9)
ERYTHROCYTE [DISTWIDTH] IN BLOOD BY AUTOMATED COUNT: 45.2 FL (ref 35.9–50)
EST. AVERAGE GLUCOSE BLD GHB EST-MCNC: 111 MG/DL
FASTING STATUS PATIENT QL REPORTED: NORMAL
GFR SERPLBLD CREATININE-BSD FMLA CKD-EPI: 81 ML/MIN/1.73 M 2
GLOBULIN SER CALC-MCNC: 2.5 G/DL (ref 1.9–3.5)
GLUCOSE SERPL-MCNC: 100 MG/DL (ref 65–99)
HBA1C MFR BLD: 5.5 % (ref 4–5.6)
HCT VFR BLD AUTO: 40.2 % (ref 37–47)
HDLC SERPL-MCNC: 55 MG/DL
HGB BLD-MCNC: 13.2 G/DL (ref 12–16)
IMM GRANULOCYTES # BLD AUTO: 0.01 K/UL (ref 0–0.11)
IMM GRANULOCYTES NFR BLD AUTO: 0.3 % (ref 0–0.9)
LDLC SERPL CALC-MCNC: 123 MG/DL
LYMPHOCYTES # BLD AUTO: 1.84 K/UL (ref 1–4.8)
LYMPHOCYTES NFR BLD: 51.1 % (ref 22–41)
MCH RBC QN AUTO: 31.5 PG (ref 27–33)
MCHC RBC AUTO-ENTMCNC: 32.8 G/DL (ref 32.2–35.5)
MCV RBC AUTO: 95.9 FL (ref 81.4–97.8)
MONOCYTES # BLD AUTO: 0.34 K/UL (ref 0–0.85)
MONOCYTES NFR BLD AUTO: 9.4 % (ref 0–13.4)
NEUTROPHILS # BLD AUTO: 1.28 K/UL (ref 1.82–7.42)
NEUTROPHILS NFR BLD: 35.6 % (ref 44–72)
NRBC # BLD AUTO: 0 K/UL
NRBC BLD-RTO: 0 /100 WBC (ref 0–0.2)
PLATELET # BLD AUTO: 297 K/UL (ref 164–446)
PMV BLD AUTO: 9.9 FL (ref 9–12.9)
POTASSIUM SERPL-SCNC: 4.1 MMOL/L (ref 3.6–5.5)
PROT SERPL-MCNC: 7.1 G/DL (ref 6–8.2)
RBC # BLD AUTO: 4.19 M/UL (ref 4.2–5.4)
SODIUM SERPL-SCNC: 141 MMOL/L (ref 135–145)
TRIGL SERPL-MCNC: 88 MG/DL (ref 0–149)
WBC # BLD AUTO: 3.6 K/UL (ref 4.8–10.8)

## 2023-06-07 PROCEDURE — 80061 LIPID PANEL: CPT

## 2023-06-07 PROCEDURE — 36415 COLL VENOUS BLD VENIPUNCTURE: CPT

## 2023-06-07 PROCEDURE — 85025 COMPLETE CBC W/AUTO DIFF WBC: CPT

## 2023-06-07 PROCEDURE — 83036 HEMOGLOBIN GLYCOSYLATED A1C: CPT

## 2023-06-07 PROCEDURE — 80053 COMPREHEN METABOLIC PANEL: CPT

## 2023-10-31 ENCOUNTER — APPOINTMENT (RX ONLY)
Dept: URBAN - METROPOLITAN AREA CLINIC 22 | Facility: CLINIC | Age: 63
Setting detail: DERMATOLOGY
End: 2023-10-31

## 2023-10-31 DIAGNOSIS — D22 MELANOCYTIC NEVI: ICD-10-CM

## 2023-10-31 DIAGNOSIS — L72.0 EPIDERMAL CYST: ICD-10-CM

## 2023-10-31 DIAGNOSIS — L81.4 OTHER MELANIN HYPERPIGMENTATION: ICD-10-CM

## 2023-10-31 DIAGNOSIS — L81.1 CHLOASMA: ICD-10-CM

## 2023-10-31 DIAGNOSIS — Z71.89 OTHER SPECIFIED COUNSELING: ICD-10-CM

## 2023-10-31 DIAGNOSIS — L663 OTHER SPECIFIED DISEASES OF HAIR AND HAIR FOLLICLES: ICD-10-CM

## 2023-10-31 DIAGNOSIS — D18.0 HEMANGIOMA: ICD-10-CM

## 2023-10-31 DIAGNOSIS — L738 OTHER SPECIFIED DISEASES OF HAIR AND HAIR FOLLICLES: ICD-10-CM

## 2023-10-31 DIAGNOSIS — L82.1 OTHER SEBORRHEIC KERATOSIS: ICD-10-CM

## 2023-10-31 DIAGNOSIS — L73.9 FOLLICULAR DISORDER, UNSPECIFIED: ICD-10-CM

## 2023-10-31 PROBLEM — D22.72 MELANOCYTIC NEVI OF LEFT LOWER LIMB, INCLUDING HIP: Status: ACTIVE | Noted: 2023-10-31

## 2023-10-31 PROBLEM — D22.5 MELANOCYTIC NEVI OF TRUNK: Status: ACTIVE | Noted: 2023-10-31

## 2023-10-31 PROBLEM — D18.01 HEMANGIOMA OF SKIN AND SUBCUTANEOUS TISSUE: Status: ACTIVE | Noted: 2023-10-31

## 2023-10-31 PROBLEM — L02.221 FURUNCLE OF ABDOMINAL WALL: Status: ACTIVE | Noted: 2023-10-31

## 2023-10-31 PROBLEM — D23.72 OTHER BENIGN NEOPLASM OF SKIN OF LEFT LOWER LIMB, INCLUDING HIP: Status: ACTIVE | Noted: 2023-10-31

## 2023-10-31 PROBLEM — D22.4 MELANOCYTIC NEVI OF SCALP AND NECK: Status: ACTIVE | Noted: 2023-10-31

## 2023-10-31 PROCEDURE — 99213 OFFICE O/P EST LOW 20 MIN: CPT

## 2023-10-31 PROCEDURE — ? COUNSELING

## 2023-10-31 PROCEDURE — ? PRESCRIPTION

## 2023-10-31 PROCEDURE — ? SUNSCREEN TREATMENT REGIMEN

## 2023-10-31 PROCEDURE — ? TREATMENT REGIMEN

## 2023-10-31 PROCEDURE — ? ADDITIONAL NOTES

## 2023-10-31 RX ORDER — H-QUINONE/TRETINOIN/HYDROCORT 4 %-0.025%
EMULSION (GRAM) TOPICAL QHS
Qty: 30 | Refills: 1 | Status: ERX | COMMUNITY
Start: 2023-10-31

## 2023-10-31 RX ORDER — TRETIONIN 0.5 MG/G
CREAM TOPICAL QHS
Qty: 20 | Refills: 2 | COMMUNITY
Start: 2023-10-31

## 2023-10-31 RX ORDER — TRETIONIN 0.5 MG/G
CREAM TOPICAL
Qty: 20 | Refills: 0 | Status: ERX

## 2023-10-31 RX ORDER — HYDROQUINONE 40 MG/G
CREAM TOPICAL
Qty: 28.35 | Refills: 0 | Status: CANCELLED
Stop reason: ENTERED-IN-ERROR

## 2023-10-31 RX ADMIN — Medication: at 00:00

## 2023-10-31 RX ADMIN — TRETIONIN: 0.5 CREAM TOPICAL at 00:00

## 2023-10-31 ASSESSMENT — LOCATION DETAILED DESCRIPTION DERM
LOCATION DETAILED: RIGHT LATERAL INFERIOR EYELID
LOCATION DETAILED: RIGHT ANTERIOR DISTAL THIGH
LOCATION DETAILED: MID POSTERIOR NECK
LOCATION DETAILED: LEFT INFERIOR CENTRAL MALAR CHEEK
LOCATION DETAILED: INFERIOR THORACIC SPINE
LOCATION DETAILED: SUPERIOR THORACIC SPINE
LOCATION DETAILED: RIGHT INFERIOR MEDIAL UPPER BACK
LOCATION DETAILED: LEFT DISTAL POSTERIOR UPPER ARM
LOCATION DETAILED: LEFT LATERAL PLANTAR MIDFOOT
LOCATION DETAILED: RIGHT SUPRAPUBIC SKIN
LOCATION DETAILED: RIGHT PROXIMAL CALF
LOCATION DETAILED: RIGHT DISTAL CALF
LOCATION DETAILED: RIGHT LATERAL UPPER BACK
LOCATION DETAILED: LEFT MEDIAL INFERIOR PRESEPTAL REGION
LOCATION DETAILED: MID TRAPEZIAL NECK
LOCATION DETAILED: LEFT PROXIMAL POSTERIOR UPPER ARM
LOCATION DETAILED: UPPER STERNUM

## 2023-10-31 ASSESSMENT — LOCATION ZONE DERM
LOCATION ZONE: EYELID
LOCATION ZONE: TRUNK
LOCATION ZONE: FEET
LOCATION ZONE: NECK
LOCATION ZONE: ARM
LOCATION ZONE: FACE
LOCATION ZONE: LEG

## 2023-10-31 ASSESSMENT — LOCATION SIMPLE DESCRIPTION DERM
LOCATION SIMPLE: UPPER BACK
LOCATION SIMPLE: RIGHT CALF
LOCATION SIMPLE: TRAPEZIAL NECK
LOCATION SIMPLE: LEFT POSTERIOR UPPER ARM
LOCATION SIMPLE: RIGHT THIGH
LOCATION SIMPLE: LEFT PLANTAR SURFACE
LOCATION SIMPLE: POSTERIOR NECK
LOCATION SIMPLE: CHEST
LOCATION SIMPLE: RIGHT UPPER BACK
LOCATION SIMPLE: LEFT INFERIOR EYELID
LOCATION SIMPLE: GROIN
LOCATION SIMPLE: LEFT CHEEK
LOCATION SIMPLE: RIGHT INFERIOR EYELID

## 2023-10-31 NOTE — PROCEDURE: TREATMENT REGIMEN
Samples Given: Onexton
Detail Level: Simple
Initiate Treatment: Onexton bid for 2 weeks with flares
Initiate Treatment: Katarya nightly for 8 weeks to brown spot on the leg

## 2023-10-31 NOTE — PROCEDURE: ADDITIONAL NOTES
Detail Level: Detailed
Render Risk Assessment In Note?: no
Additional Notes: Error\\nPrescription for retinoid is for milia \\nAlready printed and cannot delete

## 2023-11-17 ENCOUNTER — HOSPITAL ENCOUNTER (OUTPATIENT)
Dept: RADIOLOGY | Facility: MEDICAL CENTER | Age: 63
End: 2023-11-17
Attending: FAMILY MEDICINE
Payer: COMMERCIAL

## 2023-11-17 ENCOUNTER — HOSPITAL ENCOUNTER (OUTPATIENT)
Dept: LAB | Facility: MEDICAL CENTER | Age: 63
End: 2023-11-17
Attending: FAMILY MEDICINE
Payer: COMMERCIAL

## 2023-11-17 DIAGNOSIS — Z12.31 VISIT FOR SCREENING MAMMOGRAM: ICD-10-CM

## 2023-11-17 LAB
EST. AVERAGE GLUCOSE BLD GHB EST-MCNC: 117 MG/DL
HBA1C MFR BLD: 5.7 % (ref 4–5.6)

## 2023-11-17 PROCEDURE — 83036 HEMOGLOBIN GLYCOSYLATED A1C: CPT

## 2023-11-17 PROCEDURE — 36415 COLL VENOUS BLD VENIPUNCTURE: CPT

## 2023-11-17 PROCEDURE — 77063 BREAST TOMOSYNTHESIS BI: CPT

## 2024-03-19 ENCOUNTER — HOSPITAL ENCOUNTER (OUTPATIENT)
Dept: RADIOLOGY | Facility: MEDICAL CENTER | Age: 64
End: 2024-03-19
Attending: STUDENT IN AN ORGANIZED HEALTH CARE EDUCATION/TRAINING PROGRAM
Payer: COMMERCIAL

## 2024-03-19 DIAGNOSIS — M54.16 LUMBAR RADICULOPATHY: ICD-10-CM

## 2024-03-19 PROCEDURE — 72110 X-RAY EXAM L-2 SPINE 4/>VWS: CPT

## 2024-03-19 PROCEDURE — 72148 MRI LUMBAR SPINE W/O DYE: CPT

## 2024-06-22 ENCOUNTER — HOSPITAL ENCOUNTER (OUTPATIENT)
Dept: LAB | Facility: MEDICAL CENTER | Age: 64
End: 2024-06-22
Attending: FAMILY MEDICINE
Payer: COMMERCIAL

## 2024-06-22 LAB
25(OH)D3 SERPL-MCNC: 51 NG/ML (ref 30–100)
ALBUMIN SERPL BCP-MCNC: 4.3 G/DL (ref 3.2–4.9)
ALBUMIN/GLOB SERPL: 2 G/DL
ALP SERPL-CCNC: 65 U/L (ref 30–99)
ALT SERPL-CCNC: 20 U/L (ref 2–50)
ANION GAP SERPL CALC-SCNC: 11 MMOL/L (ref 7–16)
AST SERPL-CCNC: 27 U/L (ref 12–45)
BASOPHILS # BLD AUTO: 1.2 % (ref 0–1.8)
BASOPHILS # BLD: 0.05 K/UL (ref 0–0.12)
BILIRUB SERPL-MCNC: 0.3 MG/DL (ref 0.1–1.5)
BUN SERPL-MCNC: 18 MG/DL (ref 8–22)
CALCIUM ALBUM COR SERPL-MCNC: 9.5 MG/DL (ref 8.5–10.5)
CALCIUM SERPL-MCNC: 9.7 MG/DL (ref 8.5–10.5)
CHLORIDE SERPL-SCNC: 103 MMOL/L (ref 96–112)
CHOLEST SERPL-MCNC: 150 MG/DL (ref 100–199)
CO2 SERPL-SCNC: 25 MMOL/L (ref 20–33)
CREAT SERPL-MCNC: 0.68 MG/DL (ref 0.5–1.4)
EOSINOPHIL # BLD AUTO: 0.13 K/UL (ref 0–0.51)
EOSINOPHIL NFR BLD: 3 % (ref 0–6.9)
ERYTHROCYTE [DISTWIDTH] IN BLOOD BY AUTOMATED COUNT: 43.4 FL (ref 35.9–50)
EST. AVERAGE GLUCOSE BLD GHB EST-MCNC: 120 MG/DL
FASTING STATUS PATIENT QL REPORTED: NORMAL
GFR SERPLBLD CREATININE-BSD FMLA CKD-EPI: 98 ML/MIN/1.73 M 2
GLOBULIN SER CALC-MCNC: 2.2 G/DL (ref 1.9–3.5)
GLUCOSE SERPL-MCNC: 88 MG/DL (ref 65–99)
HBA1C MFR BLD: 5.8 % (ref 4–5.6)
HCT VFR BLD AUTO: 38.6 % (ref 37–47)
HDLC SERPL-MCNC: 57 MG/DL
HGB BLD-MCNC: 12.7 G/DL (ref 12–16)
IMM GRANULOCYTES # BLD AUTO: 0.01 K/UL (ref 0–0.11)
IMM GRANULOCYTES NFR BLD AUTO: 0.2 % (ref 0–0.9)
LDLC SERPL CALC-MCNC: 85 MG/DL
LYMPHOCYTES # BLD AUTO: 2.24 K/UL (ref 1–4.8)
LYMPHOCYTES NFR BLD: 51.9 % (ref 22–41)
MCH RBC QN AUTO: 31.4 PG (ref 27–33)
MCHC RBC AUTO-ENTMCNC: 32.9 G/DL (ref 32.2–35.5)
MCV RBC AUTO: 95.3 FL (ref 81.4–97.8)
MONOCYTES # BLD AUTO: 0.34 K/UL (ref 0–0.85)
MONOCYTES NFR BLD AUTO: 7.9 % (ref 0–13.4)
NEUTROPHILS # BLD AUTO: 1.55 K/UL (ref 1.82–7.42)
NEUTROPHILS NFR BLD: 35.8 % (ref 44–72)
NRBC # BLD AUTO: 0 K/UL
NRBC BLD-RTO: 0 /100 WBC (ref 0–0.2)
PLATELET # BLD AUTO: 258 K/UL (ref 164–446)
PMV BLD AUTO: 10.2 FL (ref 9–12.9)
POTASSIUM SERPL-SCNC: 4.1 MMOL/L (ref 3.6–5.5)
PROT SERPL-MCNC: 6.5 G/DL (ref 6–8.2)
RBC # BLD AUTO: 4.05 M/UL (ref 4.2–5.4)
SODIUM SERPL-SCNC: 139 MMOL/L (ref 135–145)
T4 FREE SERPL-MCNC: 1.56 NG/DL (ref 0.93–1.7)
TRIGL SERPL-MCNC: 42 MG/DL (ref 0–149)
TSH SERPL DL<=0.005 MIU/L-ACNC: 3.87 UIU/ML (ref 0.38–5.33)
WBC # BLD AUTO: 4.3 K/UL (ref 4.8–10.8)

## 2024-06-22 PROCEDURE — 85025 COMPLETE CBC W/AUTO DIFF WBC: CPT

## 2024-06-22 PROCEDURE — 84443 ASSAY THYROID STIM HORMONE: CPT

## 2024-06-22 PROCEDURE — 84439 ASSAY OF FREE THYROXINE: CPT

## 2024-06-22 PROCEDURE — 36415 COLL VENOUS BLD VENIPUNCTURE: CPT

## 2024-06-22 PROCEDURE — 82306 VITAMIN D 25 HYDROXY: CPT

## 2024-06-22 PROCEDURE — 80061 LIPID PANEL: CPT

## 2024-06-22 PROCEDURE — 80053 COMPREHEN METABOLIC PANEL: CPT

## 2024-06-22 PROCEDURE — 83036 HEMOGLOBIN GLYCOSYLATED A1C: CPT

## 2024-09-17 ENCOUNTER — HOSPITAL ENCOUNTER (OUTPATIENT)
Dept: LAB | Facility: MEDICAL CENTER | Age: 64
End: 2024-09-17
Attending: FAMILY MEDICINE
Payer: COMMERCIAL

## 2024-09-17 LAB
ANION GAP SERPL CALC-SCNC: 13 MMOL/L (ref 7–16)
BUN SERPL-MCNC: 31 MG/DL (ref 8–22)
CALCIUM SERPL-MCNC: 10 MG/DL (ref 8.5–10.5)
CHLORIDE SERPL-SCNC: 100 MMOL/L (ref 96–112)
CO2 SERPL-SCNC: 26 MMOL/L (ref 20–33)
CREAT SERPL-MCNC: 1.24 MG/DL (ref 0.5–1.4)
GFR SERPLBLD CREATININE-BSD FMLA CKD-EPI: 49 ML/MIN/1.73 M 2
GLUCOSE SERPL-MCNC: 118 MG/DL (ref 65–99)
MAGNESIUM SERPL-MCNC: 2.2 MG/DL (ref 1.5–2.5)
POTASSIUM SERPL-SCNC: 3.9 MMOL/L (ref 3.6–5.5)
SODIUM SERPL-SCNC: 139 MMOL/L (ref 135–145)

## 2024-09-17 PROCEDURE — 80048 BASIC METABOLIC PNL TOTAL CA: CPT

## 2024-09-17 PROCEDURE — 36415 COLL VENOUS BLD VENIPUNCTURE: CPT

## 2024-09-17 PROCEDURE — 83735 ASSAY OF MAGNESIUM: CPT

## 2024-10-30 ENCOUNTER — APPOINTMENT (RX ONLY)
Dept: URBAN - METROPOLITAN AREA CLINIC 22 | Facility: CLINIC | Age: 64
Setting detail: DERMATOLOGY
End: 2024-10-30

## 2024-10-30 DIAGNOSIS — D18.0 HEMANGIOMA: ICD-10-CM

## 2024-10-30 DIAGNOSIS — L81.4 OTHER MELANIN HYPERPIGMENTATION: ICD-10-CM

## 2024-10-30 DIAGNOSIS — Z71.89 OTHER SPECIFIED COUNSELING: ICD-10-CM

## 2024-10-30 DIAGNOSIS — D22 MELANOCYTIC NEVI: ICD-10-CM

## 2024-10-30 DIAGNOSIS — L82.1 OTHER SEBORRHEIC KERATOSIS: ICD-10-CM

## 2024-10-30 PROBLEM — D22.5 MELANOCYTIC NEVI OF TRUNK: Status: ACTIVE | Noted: 2024-10-30

## 2024-10-30 PROBLEM — D23.72 OTHER BENIGN NEOPLASM OF SKIN OF LEFT LOWER LIMB, INCLUDING HIP: Status: ACTIVE | Noted: 2024-10-30

## 2024-10-30 PROBLEM — D22.4 MELANOCYTIC NEVI OF SCALP AND NECK: Status: ACTIVE | Noted: 2024-10-30

## 2024-10-30 PROBLEM — D18.01 HEMANGIOMA OF SKIN AND SUBCUTANEOUS TISSUE: Status: ACTIVE | Noted: 2024-10-30

## 2024-10-30 PROCEDURE — ? COUNSELING

## 2024-10-30 PROCEDURE — 99213 OFFICE O/P EST LOW 20 MIN: CPT

## 2024-10-30 PROCEDURE — ? ADDITIONAL NOTES

## 2024-10-30 PROCEDURE — ? SUNSCREEN TREATMENT REGIMEN

## 2024-10-30 ASSESSMENT — LOCATION SIMPLE DESCRIPTION DERM
LOCATION SIMPLE: LEFT BREAST
LOCATION SIMPLE: UPPER BACK
LOCATION SIMPLE: RIGHT THIGH
LOCATION SIMPLE: RIGHT CALF
LOCATION SIMPLE: POSTERIOR NECK
LOCATION SIMPLE: RIGHT UPPER BACK
LOCATION SIMPLE: TRAPEZIAL NECK

## 2024-10-30 ASSESSMENT — LOCATION ZONE DERM
LOCATION ZONE: TRUNK
LOCATION ZONE: NECK
LOCATION ZONE: LEG

## 2024-10-30 ASSESSMENT — LOCATION DETAILED DESCRIPTION DERM
LOCATION DETAILED: INFERIOR THORACIC SPINE
LOCATION DETAILED: RIGHT ANTERIOR DISTAL THIGH
LOCATION DETAILED: RIGHT DISTAL CALF
LOCATION DETAILED: SUPERIOR THORACIC SPINE
LOCATION DETAILED: MID TRAPEZIAL NECK
LOCATION DETAILED: MID POSTERIOR NECK
LOCATION DETAILED: RIGHT PROXIMAL CALF
LOCATION DETAILED: RIGHT LATERAL UPPER BACK
LOCATION DETAILED: LEFT MEDIAL BREAST 11-12:00 REGION

## 2024-10-30 NOTE — HPI: SKIN LESION
Is This A New Presentation, Or A Follow-Up?: Skin Lesion
What Type Of Note Output Would You Prefer (Optional)?: Standard Output
How Severe Is Your Skin Lesion?: moderate
Has Your Skin Lesion Been Treated?: not been treated
Additional History: Patient states lesion appeared after application of adhesive for holter monitor she had to use a few months ago.

## 2024-10-30 NOTE — PROCEDURE: ADDITIONAL NOTES
Detail Level: Simple
Additional Notes: Discussed laser removal \\nPatient will consult cosmetic department if she would like to proceed
Render Risk Assessment In Note?: no

## 2024-11-04 ENCOUNTER — HOSPITAL ENCOUNTER (OUTPATIENT)
Facility: MEDICAL CENTER | Age: 64
End: 2024-11-04
Attending: INTERNAL MEDICINE
Payer: COMMERCIAL

## 2024-11-04 LAB
ALBUMIN SERPL BCP-MCNC: 4.5 G/DL (ref 3.2–4.9)
ALBUMIN/GLOB SERPL: 1.9 G/DL
ALP SERPL-CCNC: 47 U/L (ref 30–99)
ALT SERPL-CCNC: 33 U/L (ref 2–50)
ANION GAP SERPL CALC-SCNC: 12 MMOL/L (ref 7–16)
AST SERPL-CCNC: 45 U/L (ref 12–45)
BILIRUB SERPL-MCNC: 0.5 MG/DL (ref 0.1–1.5)
BUN SERPL-MCNC: 25 MG/DL (ref 8–22)
CALCIUM ALBUM COR SERPL-MCNC: 9.8 MG/DL (ref 8.5–10.5)
CALCIUM SERPL-MCNC: 10.2 MG/DL (ref 8.5–10.5)
CHLORIDE SERPL-SCNC: 105 MMOL/L (ref 96–112)
CO2 SERPL-SCNC: 26 MMOL/L (ref 20–33)
CREAT SERPL-MCNC: 0.96 MG/DL (ref 0.5–1.4)
ERYTHROCYTE [SEDIMENTATION RATE] IN BLOOD BY WESTERGREN METHOD: 11 MM/HOUR (ref 0–25)
FOLATE SERPL-MCNC: >40 NG/ML
GFR SERPLBLD CREATININE-BSD FMLA CKD-EPI: 67 ML/MIN/1.73 M 2
GLOBULIN SER CALC-MCNC: 2.4 G/DL (ref 1.9–3.5)
GLUCOSE SERPL-MCNC: 127 MG/DL (ref 65–99)
HBV CORE AB SERPL QL IA: NONREACTIVE
HBV SURFACE AB SERPL IA-ACNC: <3.5 MIU/ML (ref 0–10)
HBV SURFACE AG SER QL: NORMAL
HCV AB SER QL: NORMAL
LDH SERPL L TO P-CCNC: 194 U/L (ref 107–266)
POTASSIUM SERPL-SCNC: 3.9 MMOL/L (ref 3.6–5.5)
PROT SERPL-MCNC: 6.9 G/DL (ref 6–8.2)
RHEUMATOID FACT SER IA-ACNC: 11 IU/ML (ref 0–14)
SODIUM SERPL-SCNC: 143 MMOL/L (ref 135–145)
VIT B12 SERPL-MCNC: 2427 PG/ML (ref 211–911)

## 2024-11-04 PROCEDURE — 87340 HEPATITIS B SURFACE AG IA: CPT

## 2024-11-04 PROCEDURE — 86431 RHEUMATOID FACTOR QUANT: CPT

## 2024-11-04 PROCEDURE — 84155 ASSAY OF PROTEIN SERUM: CPT

## 2024-11-04 PROCEDURE — 86038 ANTINUCLEAR ANTIBODIES: CPT

## 2024-11-04 PROCEDURE — 85652 RBC SED RATE AUTOMATED: CPT

## 2024-11-04 PROCEDURE — 86706 HEP B SURFACE ANTIBODY: CPT

## 2024-11-04 PROCEDURE — 82746 ASSAY OF FOLIC ACID SERUM: CPT

## 2024-11-04 PROCEDURE — 83615 LACTATE (LD) (LDH) ENZYME: CPT

## 2024-11-04 PROCEDURE — 84165 PROTEIN E-PHORESIS SERUM: CPT

## 2024-11-04 PROCEDURE — 82607 VITAMIN B-12: CPT

## 2024-11-04 PROCEDURE — 86803 HEPATITIS C AB TEST: CPT

## 2024-11-04 PROCEDURE — 80053 COMPREHEN METABOLIC PANEL: CPT

## 2024-11-04 PROCEDURE — 86704 HEP B CORE ANTIBODY TOTAL: CPT

## 2024-11-06 LAB — NUCLEAR IGG SER QL IA: NORMAL

## 2024-11-07 LAB
ALBUMIN SERPL ELPH-MCNC: 4.49 G/DL (ref 3.75–5.01)
ALPHA1 GLOB SERPL ELPH-MCNC: 0.24 G/DL (ref 0.19–0.46)
ALPHA2 GLOB SERPL ELPH-MCNC: 0.48 G/DL (ref 0.48–1.05)
B-GLOBULIN SERPL ELPH-MCNC: 0.77 G/DL (ref 0.48–1.1)
GAMMA GLOB SERPL ELPH-MCNC: 0.92 G/DL (ref 0.62–1.51)
INTERPRETATION SERPL IFE-IMP: NORMAL
MONOCLON BAND OBS SERPL: NORMAL
MONOCLONAL PROTEIN NL11656: NORMAL G/DL
PATHOLOGY STUDY: NORMAL
PROT SERPL-MCNC: 6.9 G/DL (ref 6.3–8.2)

## 2024-12-12 ENCOUNTER — HOSPITAL ENCOUNTER (OUTPATIENT)
Dept: RADIOLOGY | Facility: MEDICAL CENTER | Age: 64
End: 2024-12-12
Attending: INTERNAL MEDICINE
Payer: COMMERCIAL

## 2024-12-12 DIAGNOSIS — D70.8 OTHER NEUTROPENIA (HCC): ICD-10-CM

## 2024-12-12 PROCEDURE — 76700 US EXAM ABDOM COMPLETE: CPT

## 2024-12-24 ENCOUNTER — HOSPITAL ENCOUNTER (OUTPATIENT)
Dept: RADIOLOGY | Facility: MEDICAL CENTER | Age: 64
End: 2024-12-24
Attending: FAMILY MEDICINE
Payer: COMMERCIAL

## 2024-12-24 DIAGNOSIS — Z12.31 VISIT FOR SCREENING MAMMOGRAM: ICD-10-CM

## 2024-12-24 PROCEDURE — 77067 SCR MAMMO BI INCL CAD: CPT

## 2025-01-07 ENCOUNTER — APPOINTMENT (OUTPATIENT)
Dept: URBAN - METROPOLITAN AREA CLINIC 20 | Facility: CLINIC | Age: 65
Setting detail: DERMATOLOGY
End: 2025-01-07

## 2025-01-07 DIAGNOSIS — D18.0 HEMANGIOMA: ICD-10-CM

## 2025-01-07 DIAGNOSIS — Z41.9 ENCOUNTER FOR PROCEDURE FOR PURPOSES OTHER THAN REMEDYING HEALTH STATE, UNSPECIFIED: ICD-10-CM

## 2025-01-07 PROBLEM — D18.01 HEMANGIOMA OF SKIN AND SUBCUTANEOUS TISSUE: Status: ACTIVE | Noted: 2025-01-07

## 2025-01-07 PROCEDURE — ? PULSED-DYE LASER

## 2025-01-07 PROCEDURE — ? COSMETIC CONSULTATION - PULSED-DYE LASER

## 2025-01-07 ASSESSMENT — LOCATION DETAILED DESCRIPTION DERM
LOCATION DETAILED: LEFT MEDIAL SUPERIOR CHEST
LOCATION DETAILED: LEFT MEDIAL BREAST 11-12:00 REGION

## 2025-01-07 ASSESSMENT — LOCATION ZONE DERM: LOCATION ZONE: TRUNK

## 2025-01-07 ASSESSMENT — LOCATION SIMPLE DESCRIPTION DERM
LOCATION SIMPLE: CHEST
LOCATION SIMPLE: LEFT BREAST

## 2025-01-07 NOTE — PROCEDURE: PULSED-DYE LASER
Delay Time (Ms): 20
Fluence In J/Cm2 (Optional): 7
Cryogen Time (Ms): 30
Consent: Written consent obtained, risks reviewed including but not limited to crusting, scabbing, blistering, scarring, darker or lighter pigmentary change, incidental hair removal, bruising, and/or incomplete removal.
Pulse Duration: 20 ms
Spot Size: 7 mm
Post-Procedure Care: Vaseline and ice applied. Post care reviewed with patient.
Spot Size: 5 mm
Fluence In J/Cm2 (Optional): 10
Post-Care Instructions: I reviewed with the patient in detail post-care instructions. Patient should stay away from the sun and wear sun protection until treated areas are fully healed.
Detail Level: Zone
Pulse Duration: 40 ms
Pulse Duration: 1.5 ms
Pulse Duration: 10 ms
Laser Type: Vbeam 595nm
Location (Required For Details To Render In Note But Body Touch Will Also Count For First Location): decolletage of the chest

## 2025-01-30 ENCOUNTER — HOSPITAL ENCOUNTER (OUTPATIENT)
Dept: LAB | Facility: MEDICAL CENTER | Age: 65
End: 2025-01-30
Attending: FAMILY MEDICINE
Payer: COMMERCIAL

## 2025-01-30 ENCOUNTER — HOSPITAL ENCOUNTER (OUTPATIENT)
Dept: RADIOLOGY | Facility: MEDICAL CENTER | Age: 65
End: 2025-01-30
Attending: FAMILY MEDICINE
Payer: COMMERCIAL

## 2025-01-30 DIAGNOSIS — M54.2 CERVICALGIA: ICD-10-CM

## 2025-01-30 DIAGNOSIS — R51.9 RECURRENT OCCIPITAL HEADACHE: ICD-10-CM

## 2025-01-30 PROCEDURE — 72040 X-RAY EXAM NECK SPINE 2-3 VW: CPT

## 2025-07-01 ENCOUNTER — HOSPITAL ENCOUNTER (OUTPATIENT)
Dept: LAB | Facility: MEDICAL CENTER | Age: 65
End: 2025-07-01
Attending: FAMILY MEDICINE
Payer: COMMERCIAL

## 2025-07-01 LAB
ALBUMIN SERPL BCP-MCNC: 4.5 G/DL (ref 3.2–4.9)
ALBUMIN/GLOB SERPL: 1.7 G/DL
ALP SERPL-CCNC: 55 U/L (ref 30–99)
ALT SERPL-CCNC: 24 U/L (ref 2–50)
ANION GAP SERPL CALC-SCNC: 13 MMOL/L (ref 7–16)
AST SERPL-CCNC: 35 U/L (ref 12–45)
BILIRUB SERPL-MCNC: 0.6 MG/DL (ref 0.1–1.5)
BUN SERPL-MCNC: 27 MG/DL (ref 8–22)
CALCIUM ALBUM COR SERPL-MCNC: 10.1 MG/DL (ref 8.5–10.5)
CALCIUM SERPL-MCNC: 10.5 MG/DL (ref 8.5–10.5)
CHLORIDE SERPL-SCNC: 105 MMOL/L (ref 96–112)
CHOLEST SERPL-MCNC: 165 MG/DL (ref 100–199)
CO2 SERPL-SCNC: 24 MMOL/L (ref 20–33)
CREAT SERPL-MCNC: 0.89 MG/DL (ref 0.5–1.4)
CREAT UR-MCNC: 23.7 MG/DL
EST. AVERAGE GLUCOSE BLD GHB EST-MCNC: 120 MG/DL
GFR SERPLBLD CREATININE-BSD FMLA CKD-EPI: 73 ML/MIN/1.73 M 2
GLOBULIN SER CALC-MCNC: 2.7 G/DL (ref 1.9–3.5)
GLUCOSE SERPL-MCNC: 93 MG/DL (ref 65–99)
HBA1C MFR BLD: 5.8 % (ref 4–5.6)
HDLC SERPL-MCNC: 57 MG/DL
LDLC SERPL CALC-MCNC: 92 MG/DL
MICROALBUMIN UR-MCNC: <1.2 MG/DL
MICROALBUMIN/CREAT UR: NORMAL MG/G (ref 0–30)
POTASSIUM SERPL-SCNC: 4.5 MMOL/L (ref 3.6–5.5)
PROT SERPL-MCNC: 7.2 G/DL (ref 6–8.2)
SODIUM SERPL-SCNC: 142 MMOL/L (ref 135–145)
TRIGL SERPL-MCNC: 79 MG/DL (ref 0–149)

## 2025-07-01 PROCEDURE — 83036 HEMOGLOBIN GLYCOSYLATED A1C: CPT

## 2025-07-01 PROCEDURE — 82570 ASSAY OF URINE CREATININE: CPT

## 2025-07-01 PROCEDURE — 82043 UR ALBUMIN QUANTITATIVE: CPT

## 2025-07-01 PROCEDURE — 36415 COLL VENOUS BLD VENIPUNCTURE: CPT

## 2025-07-01 PROCEDURE — 80061 LIPID PANEL: CPT

## 2025-07-01 PROCEDURE — 80053 COMPREHEN METABOLIC PANEL: CPT

## 2025-07-11 ENCOUNTER — HOSPITAL ENCOUNTER (OUTPATIENT)
Dept: RADIOLOGY | Facility: MEDICAL CENTER | Age: 65
End: 2025-07-11
Attending: FAMILY MEDICINE
Payer: COMMERCIAL

## 2025-07-11 DIAGNOSIS — M81.0 PERIMENOPAUSAL BONE LOSS: ICD-10-CM

## 2025-07-11 PROCEDURE — 77080 DXA BONE DENSITY AXIAL: CPT

## 2025-08-23 ENCOUNTER — APPOINTMENT (OUTPATIENT)
Dept: RADIOLOGY | Facility: MEDICAL CENTER | Age: 65
End: 2025-08-23
Attending: FAMILY MEDICINE
Payer: COMMERCIAL